# Patient Record
Sex: FEMALE | Race: WHITE | Employment: OTHER | URBAN - METROPOLITAN AREA
[De-identification: names, ages, dates, MRNs, and addresses within clinical notes are randomized per-mention and may not be internally consistent; named-entity substitution may affect disease eponyms.]

---

## 2021-04-24 ENCOUNTER — HOSPITAL ENCOUNTER (EMERGENCY)
Age: 86
Discharge: HOME | End: 2021-04-24
Attending: EMERGENCY MEDICINE
Payer: COMMERCIAL

## 2021-04-24 VITALS
HEART RATE: 71 BPM | DIASTOLIC BLOOD PRESSURE: 67 MMHG | HEIGHT: 62 IN | RESPIRATION RATE: 18 BRPM | SYSTOLIC BLOOD PRESSURE: 147 MMHG | WEIGHT: 168 LBS | BODY MASS INDEX: 30.91 KG/M2 | TEMPERATURE: 98.2 F | OXYGEN SATURATION: 95 %

## 2021-04-24 DIAGNOSIS — S81.812A SKIN TEAR OF LEFT LOWER LEG WITHOUT COMPLICATION, INITIAL ENCOUNTER: Primary | ICD-10-CM

## 2021-04-24 PROCEDURE — 99283 EMERGENCY DEPT VISIT LOW MDM: CPT

## 2021-04-24 RX ORDER — ASPIRIN 81 MG/1
TABLET ORAL
COMMUNITY

## 2021-04-24 RX ORDER — LEVOTHYROXINE SODIUM 175 UG/1
TABLET ORAL
COMMUNITY

## 2021-04-24 RX ORDER — CEPHALEXIN 500 MG/1
500 CAPSULE ORAL 2 TIMES DAILY
Qty: 14 CAPSULE | Refills: 0 | Status: SHIPPED | OUTPATIENT
Start: 2021-04-24 | End: 2021-05-01

## 2021-04-24 RX ORDER — LANOLIN ALCOHOL/MO/W.PET/CERES
CREAM (GRAM) TOPICAL
COMMUNITY

## 2021-04-24 RX ORDER — LATANOPROST 50 UG/ML
SOLUTION/ DROPS OPHTHALMIC
COMMUNITY

## 2021-04-24 RX ORDER — PYRIDOSTIGMINE BROMIDE 60 MG/1
60 TABLET ORAL 3 TIMES DAILY
COMMUNITY

## 2021-04-24 NOTE — ED PROVIDER NOTES
Emergency Department         COMPLAINT       Chief Complaint   Patient presents with    Ankle Pain     abrasion to left lateral ankle x 1 week with mild swelling and numbness      PHYSICAL EXAM      ED Triage Vitals [04/24/21 1421]   BP Temp Temp Source Pulse Resp SpO2 Height Weight   (!) 147/67 98.2 °F (36.8 °C) Oral 71 18 95 % 5' 2\" (1.575 m) 168 lb (76.2 kg)         Constitutional: Alert, oriented x3, nontoxic, answering questions appropriately, acting properly for age, in no acute distress   HEENT: Extraocular muscles intact,  Neck: Trachea midline, s  Musculoskeletal: Left lower extremity no pain at the knee. There is tenderness to lateral aspect left ankle with there is a small skin tear measuring approximate 1 cm in length. Scabbing. There is surrounding erythema measuring approximately 5 to 6 cm with some mild peripheral edema. Pedal pulses intact. Capillary refill less than 2 seconds. Neurologic: Left lower extremity motor and sensory intact. Skin: Warm and dry   Physical Exam  Musculoskeletal:        Legs:        DIAGNOSTIC RESULTS     EKG: All EKG's are interpreted by the Emergency Department Physician who either signs or Co-signs this chart in the absence of a cardiologist.    Not indicated unless otherwise documented above or in the midlevel documentation    LABS:  No results found for this visit on 04/24/21. Not indicated unless otherwise documented above or in the midlevel documentation    RADIOLOGY:   I reviewedthe radiologist interpretations:  No orders to display       Not indicated unless otherwise documented above or in the midlevel documentation    EMERGENCY DEPARTMENT COURSE:       PERTINENT ATTENDING PHYSICIAN COMMENTS:    Injury a week ago. Now red and swollen. Took family members dose of amoxicillin. No other symptoms. No drainage. Will start on Keflex. Close follow-up. Return if worse.     Faculty Attestation    I performed a history and physical examination of the patient and discussed management with the mid level provideer. I reviewed the mid level provider's note and agree with the documented findings and plan of care. Any areas of disagreement are noted on the chart. I was personally present for the key portions of any procedures. I have documented in the chart those procedures where I was not present during the key portions. I have reviewed the emergency nurses triage note. I agree with the chief complaint, past medical history, past surgical history, allergies, medications, social and family history as documented unless otherwise noted below. Documentation of the HPI, Physical Exam and Medical Decision Making performed by medical students or scribes is based on my personal performance of the HPI, PE and MDM. For Physician Assistant/ Nurse Practitioner cases/documentation I have personally evaluated this patient and have completed at least one if not all key elements of the E/M (history, physical exam, and MDM). Additional findings are as noted.        Bernard Ponce,   04/24/21 6111

## 2021-04-24 NOTE — ED PROVIDER NOTES
Temp 98.2 °F (36.8 °C) (Oral)   Resp 18   Ht 5' 2\" (1.575 m)   Wt 76.2 kg (168 lb)   SpO2 95%   BMI 30.73 kg/m²     Constitutional:  Well developed,  Well-appearing   Eyes:  Pupils equal   HENT:  Atraumatic, external ears normal, nose normal  Respiratory:  Comfortable speech and breathing  Musculoskeletal:    Full ROM of bilat UE/LE, affected area  Integument:  1.5cm skin tear superior to left malleolus, no bleeding/discharge. Area ringed with slightly with  mild edema and erythema ~ 5cm. No TTP reported. Moving ankle/foot/toes wnl. Neurologic:  Alert & oriented x 3, no focal deficits noted       DIAGNOSTIC RESULTS     EKG: All EKG's are interpreted by the Emergency Department Physician who either signs or Co-signs this chart in the absence of a cardiologist.  Not indicated    RADIOLOGY:   Reviewed the radiologist:  No orders to display     Not indicated      LABS:  Labs Reviewed - No data to display  Not indicated    EMERGENCY DEPARTMENT COURSE:     0778  Wound with some very mild erythema/edema. Providing Keflex rx. Directed to continue to keep wound clean/dry and dressed lightly as this heals. Orders Placed This Encounter   Medications    cephALEXin (KEFLEX) 500 MG capsule     Sig: Take 1 capsule by mouth 2 times daily for 7 days     Dispense:  14 capsule     Refill:  0       CONSULTS:  None      FINAL IMPRESSION      1.  Skin tear of left lower leg without complication, initial encounter          DISPOSITION/PLAN:  DISPOSITION Decision To Discharge 04/24/2021 02:41:37 PM        PATIENT REFERRED TO:  Dara Dayna  [address] [place] [address]  [phone]    Schedule an appointment as soon as possible for a visit in 3 days  for wound check      DISCHARGE MEDICATIONS:  New Prescriptions    CEPHALEXIN (KEFLEX) 500 MG CAPSULE    Take 1 capsule by mouth 2 times daily for 7 days       (Please note that portions of this note were completed with a voice recognition program. Efforts were made to edit the dictations but occasionally words are mis-transcribed.)    AIDA Spencer PA-C  04/24/21 4885

## 2022-11-05 ENCOUNTER — HOSPITAL ENCOUNTER (OUTPATIENT)
Dept: ULTRASOUND IMAGING | Age: 87
Discharge: HOME | End: 2022-11-07
Payer: COMMERCIAL

## 2022-11-05 DIAGNOSIS — N39.0 RECURRENT UTI: ICD-10-CM

## 2022-11-05 PROCEDURE — 76770 US EXAM ABDO BACK WALL COMP: CPT

## 2023-04-14 ENCOUNTER — APPOINTMENT (OUTPATIENT)
Dept: CT IMAGING | Age: 88
End: 2023-04-14
Payer: COMMERCIAL

## 2023-04-14 ENCOUNTER — HOSPITAL ENCOUNTER (EMERGENCY)
Age: 88
Discharge: HOME | End: 2023-04-14
Attending: EMERGENCY MEDICINE
Payer: COMMERCIAL

## 2023-04-14 VITALS
HEART RATE: 72 BPM | WEIGHT: 168 LBS | TEMPERATURE: 98.2 F | HEIGHT: 63 IN | RESPIRATION RATE: 16 BRPM | DIASTOLIC BLOOD PRESSURE: 73 MMHG | OXYGEN SATURATION: 94 % | SYSTOLIC BLOOD PRESSURE: 119 MMHG | BODY MASS INDEX: 29.77 KG/M2

## 2023-04-14 DIAGNOSIS — M54.32 SCIATICA OF LEFT SIDE: Primary | ICD-10-CM

## 2023-04-14 DIAGNOSIS — N83.201 CYST OF RIGHT OVARY: ICD-10-CM

## 2023-04-14 PROCEDURE — 99284 EMERGENCY DEPT VISIT MOD MDM: CPT

## 2023-04-14 PROCEDURE — 6370000000 HC RX 637 (ALT 250 FOR IP): Performed by: EMERGENCY MEDICINE

## 2023-04-14 PROCEDURE — 72131 CT LUMBAR SPINE W/O DYE: CPT

## 2023-04-14 RX ORDER — PREDNISONE 20 MG/1
40 TABLET ORAL ONCE
Status: COMPLETED | OUTPATIENT
Start: 2023-04-14 | End: 2023-04-14

## 2023-04-14 RX ORDER — OXYCODONE HYDROCHLORIDE AND ACETAMINOPHEN 5; 325 MG/1; MG/1
1-2 TABLET ORAL EVERY 6 HOURS PRN
Qty: 12 TABLET | Refills: 0 | Status: SHIPPED | OUTPATIENT
Start: 2023-04-14 | End: 2023-04-18

## 2023-04-14 RX ORDER — OXYCODONE HYDROCHLORIDE AND ACETAMINOPHEN 5; 325 MG/1; MG/1
1 TABLET ORAL ONCE
Status: COMPLETED | OUTPATIENT
Start: 2023-04-14 | End: 2023-04-14

## 2023-04-14 RX ORDER — PREDNISONE 10 MG/1
TABLET ORAL
Qty: 20 TABLET | Refills: 0 | Status: SHIPPED | OUTPATIENT
Start: 2023-04-14 | End: 2023-04-24

## 2023-04-14 RX ORDER — PREDNISONE 10 MG/1
TABLET ORAL
Qty: 20 TABLET | Refills: 0 | Status: SHIPPED | OUTPATIENT
Start: 2023-04-14 | End: 2023-04-14 | Stop reason: SDUPTHER

## 2023-04-14 RX ADMIN — PREDNISONE 40 MG: 20 TABLET ORAL at 04:44

## 2023-04-14 RX ADMIN — OXYCODONE HYDROCHLORIDE AND ACETAMINOPHEN 1 TABLET: 5; 325 TABLET ORAL at 04:44

## 2023-04-14 ASSESSMENT — PAIN SCALES - GENERAL: PAINLEVEL_OUTOF10: 9

## 2023-04-14 ASSESSMENT — PAIN - FUNCTIONAL ASSESSMENT: PAIN_FUNCTIONAL_ASSESSMENT: 0-10

## 2023-04-14 ASSESSMENT — PAIN DESCRIPTION - ORIENTATION: ORIENTATION: LEFT;RIGHT;MID

## 2023-04-14 ASSESSMENT — ENCOUNTER SYMPTOMS
ABDOMINAL PAIN: 0
COUGH: 0
SORE THROAT: 0
DIARRHEA: 0
EYE PAIN: 0
BACK PAIN: 1
RHINORRHEA: 0
NAUSEA: 0
SHORTNESS OF BREATH: 0
VOMITING: 0

## 2023-04-14 ASSESSMENT — PAIN DESCRIPTION - DESCRIPTORS: DESCRIPTORS: DISCOMFORT

## 2023-04-14 ASSESSMENT — PAIN DESCRIPTION - LOCATION: LOCATION: BACK

## 2023-04-14 ASSESSMENT — PAIN DESCRIPTION - PAIN TYPE: TYPE: ACUTE PAIN

## 2023-04-14 NOTE — DISCHARGE INSTRUCTIONS
PLEASE RETURN TO THE EMERGENCY DEPARTMENT IMMEDIATELY if your symptoms worsen in anyway or in 1-2 days if not improved for re-evaluation. You should immediately return to the ER for symptoms such as worsening pain, abdominal pain, bloody stools, numbness or weakness to the arms or legs, difficulty with urination or defecation, difficulty with ambulation, fever, coolness or color change to the extremity, chest pain, shortness of breath, a feeling that you are going to pass out, light headed, dizziness. Take your medication as indicated and prescribed. If you are given an antibiotic then, make sure you get the prescription filled and take the antibiotics until finished. Please understand that at this time there is no evidence for a more serious underlying process, but that early in the process of an illness or injury, an emergency department workup can be falsely reassuring. You should contact your family doctor within the next 48 hours for a follow up appointment    David Augustine!!!    From [facility]) and [facility] Emergency Services    On behalf of the Emergency Department staff at [facility]), I would like to thank you for giving us the opportunity to address your health care needs and concerns. We hope that during your visit, our service was delivered in a professional and caring manner. Please keep [facility]) in mind as we walk with you down the path to your own personal wellness. Please expect an automated text message or email from us so we can ask a few questions about your health and progress. Based on your answers, a clinician may call you back to offer help and instructions. Please understand that early in the process of an illness or injury, an emergency department workup can be falsely reassuring. If you notice any worsening, changing or persistent symptoms please call your family doctor or return to the ER immediately.      Tell us how we did during your visit at

## 2023-09-28 DIAGNOSIS — G70.00 MYASTHENIA GRAVIS WITHOUT (ACUTE) EXACERBATION (HCC): ICD-10-CM

## 2023-09-28 RX ORDER — PYRIDOSTIGMINE BROMIDE 60 MG/1
60 TABLET ORAL 3 TIMES DAILY
Qty: 90 TABLET | Refills: 2 | OUTPATIENT
Start: 2023-09-28

## 2023-10-12 ENCOUNTER — APPOINTMENT (OUTPATIENT)
Dept: CT IMAGING | Age: 88
End: 2023-10-12
Payer: COMMERCIAL

## 2023-10-12 ENCOUNTER — HOSPITAL ENCOUNTER (EMERGENCY)
Age: 88
Discharge: HOME | End: 2023-10-13
Attending: EMERGENCY MEDICINE
Payer: COMMERCIAL

## 2023-10-12 ENCOUNTER — APPOINTMENT (OUTPATIENT)
Dept: GENERAL RADIOLOGY | Age: 88
End: 2023-10-12
Payer: COMMERCIAL

## 2023-10-12 VITALS
TEMPERATURE: 98.8 F | RESPIRATION RATE: 14 BRPM | SYSTOLIC BLOOD PRESSURE: 103 MMHG | HEART RATE: 75 BPM | OXYGEN SATURATION: 98 % | BODY MASS INDEX: 30.12 KG/M2 | HEIGHT: 63 IN | DIASTOLIC BLOOD PRESSURE: 57 MMHG | WEIGHT: 170 LBS

## 2023-10-12 DIAGNOSIS — R41.82 ALTERED MENTAL STATUS, UNSPECIFIED ALTERED MENTAL STATUS TYPE: Primary | ICD-10-CM

## 2023-10-12 DIAGNOSIS — J18.9 PNEUMONIA OF LEFT LOWER LOBE DUE TO INFECTIOUS ORGANISM: ICD-10-CM

## 2023-10-12 DIAGNOSIS — N94.89 ADNEXAL MASS: ICD-10-CM

## 2023-10-12 DIAGNOSIS — R74.8 ELEVATED LIPASE: ICD-10-CM

## 2023-10-12 LAB
ALBUMIN SERPL-MCNC: 3.9 G/DL (ref 3.5–5.2)
ALBUMIN/GLOB SERPL: 1.6 {RATIO} (ref 1–2.5)
ALP SERPL-CCNC: 116 U/L (ref 35–104)
ALT SERPL-CCNC: 9 U/L (ref 5–33)
ANION GAP SERPL CALCULATED.3IONS-SCNC: 8 MMOL/L (ref 9–17)
AST SERPL-CCNC: 17 U/L
BACTERIA URNS QL MICRO: ABNORMAL
BASOPHILS # BLD: 0 K/UL (ref 0–0.2)
BASOPHILS NFR BLD: 1 % (ref 0–2)
BILIRUB SERPL-MCNC: 0.2 MG/DL (ref 0.3–1.2)
BILIRUB UR QL STRIP: NEGATIVE
BNP SERPL-MCNC: 823 PG/ML
BUN SERPL-MCNC: 23 MG/DL (ref 8–23)
CALCIUM SERPL-MCNC: 9.8 MG/DL (ref 8.6–10.4)
CHARACTER UR: ABNORMAL
CHLORIDE SERPL-SCNC: 105 MMOL/L (ref 98–107)
CLARITY UR: CLEAR
CO2 SERPL-SCNC: 28 MMOL/L (ref 20–31)
COLOR UR: YELLOW
CREAT SERPL-MCNC: 0.9 MG/DL (ref 0.5–0.9)
CRYSTALS URNS MICRO: ABNORMAL /HPF
EOSINOPHIL # BLD: 0.1 K/UL (ref 0–0.4)
EOSINOPHILS RELATIVE PERCENT: 3 % (ref 1–4)
EPI CELLS #/AREA URNS HPF: ABNORMAL /HPF (ref 0–5)
ERYTHROCYTE [DISTWIDTH] IN BLOOD BY AUTOMATED COUNT: 16.4 % (ref 12.5–15.4)
GFR SERPL CREATININE-BSD FRML MDRD: 59 ML/MIN/1.73M2
GLUCOSE BLD-MCNC: 132 MG/DL (ref 65–105)
GLUCOSE SERPL-MCNC: 123 MG/DL (ref 70–99)
GLUCOSE UR STRIP-MCNC: NEGATIVE MG/DL
HCT VFR BLD AUTO: 39.5 % (ref 36–46)
HGB BLD-MCNC: 13.1 G/DL (ref 12–16)
HGB UR QL STRIP.AUTO: NEGATIVE
KETONES UR STRIP-MCNC: NEGATIVE MG/DL
LEUKOCYTE ESTERASE UR QL STRIP: ABNORMAL
LIPASE SERPL-CCNC: 364 U/L (ref 13–60)
LYMPHOCYTES NFR BLD: 0.9 K/UL (ref 1–4.8)
LYMPHOCYTES RELATIVE PERCENT: 22 % (ref 24–44)
MCH RBC QN AUTO: 28.6 PG (ref 26–34)
MCHC RBC AUTO-ENTMCNC: 33.2 G/DL (ref 31–37)
MCV RBC AUTO: 86.2 FL (ref 80–100)
MONOCYTES NFR BLD: 0.5 K/UL (ref 0.1–1.2)
MONOCYTES NFR BLD: 11 % (ref 2–11)
NEUTROPHILS NFR BLD: 63 % (ref 36–66)
NEUTS SEG NFR BLD: 2.7 K/UL (ref 1.8–7.7)
NITRITE UR QL STRIP: NEGATIVE
PH UR STRIP: 6 [PH] (ref 5–8)
PLATELET # BLD AUTO: 262 K/UL (ref 140–450)
PMV BLD AUTO: 8.7 FL (ref 6–12)
POTASSIUM SERPL-SCNC: 3.8 MMOL/L (ref 3.7–5.3)
PROT SERPL-MCNC: 6.4 G/DL (ref 6.4–8.3)
PROT UR STRIP-MCNC: NEGATIVE MG/DL
RBC # BLD AUTO: 4.58 M/UL (ref 4–5.2)
RBC #/AREA URNS HPF: ABNORMAL /HPF (ref 0–2)
SODIUM SERPL-SCNC: 141 MMOL/L (ref 135–144)
SP GR UR STRIP: 1.03 (ref 1–1.03)
TROPONIN I SERPL HS-MCNC: 18 NG/L (ref 0–14)
TSH SERPL DL<=0.05 MIU/L-ACNC: 1.65 UIU/ML (ref 0.3–5)
UROBILINOGEN UR STRIP-ACNC: ABNORMAL EU/DL (ref 0–1)
WBC #/AREA URNS HPF: ABNORMAL /HPF (ref 0–5)
WBC OTHER # BLD: 4.2 K/UL (ref 3.5–11)

## 2023-10-12 PROCEDURE — 96365 THER/PROPH/DIAG IV INF INIT: CPT | Performed by: EMERGENCY MEDICINE

## 2023-10-12 PROCEDURE — 81001 URINALYSIS AUTO W/SCOPE: CPT

## 2023-10-12 PROCEDURE — 36415 COLL VENOUS BLD VENIPUNCTURE: CPT

## 2023-10-12 PROCEDURE — 83690 ASSAY OF LIPASE: CPT

## 2023-10-12 PROCEDURE — 99285 EMERGENCY DEPT VISIT HI MDM: CPT | Performed by: EMERGENCY MEDICINE

## 2023-10-12 PROCEDURE — 83880 ASSAY OF NATRIURETIC PEPTIDE: CPT

## 2023-10-12 PROCEDURE — 85025 COMPLETE CBC W/AUTO DIFF WBC: CPT

## 2023-10-12 PROCEDURE — 71045 X-RAY EXAM CHEST 1 VIEW: CPT

## 2023-10-12 PROCEDURE — 93005 ELECTROCARDIOGRAM TRACING: CPT | Performed by: EMERGENCY MEDICINE

## 2023-10-12 PROCEDURE — 82947 ASSAY GLUCOSE BLOOD QUANT: CPT

## 2023-10-12 PROCEDURE — 96367 TX/PROPH/DG ADDL SEQ IV INF: CPT | Performed by: EMERGENCY MEDICINE

## 2023-10-12 PROCEDURE — 84484 ASSAY OF TROPONIN QUANT: CPT

## 2023-10-12 PROCEDURE — 2580000003 HC RX 258: Performed by: EMERGENCY MEDICINE

## 2023-10-12 PROCEDURE — 80053 COMPREHEN METABOLIC PANEL: CPT

## 2023-10-12 PROCEDURE — 84443 ASSAY THYROID STIM HORMONE: CPT

## 2023-10-12 PROCEDURE — 74176 CT ABD & PELVIS W/O CONTRAST: CPT

## 2023-10-12 PROCEDURE — 6360000002 HC RX W HCPCS: Performed by: EMERGENCY MEDICINE

## 2023-10-12 RX ORDER — 0.9 % SODIUM CHLORIDE 0.9 %
1000 INTRAVENOUS SOLUTION INTRAVENOUS ONCE
Status: COMPLETED | OUTPATIENT
Start: 2023-10-12 | End: 2023-10-12

## 2023-10-12 RX ORDER — ACETAMINOPHEN 500 MG
1000 TABLET ORAL EVERY 8 HOURS PRN
Qty: 60 TABLET | Refills: 0 | Status: SHIPPED | OUTPATIENT
Start: 2023-10-12

## 2023-10-12 RX ORDER — AZITHROMYCIN 250 MG/1
250 TABLET, FILM COATED ORAL DAILY
Qty: 4 TABLET | Refills: 0 | Status: SHIPPED | OUTPATIENT
Start: 2023-10-13 | End: 2023-10-17

## 2023-10-12 RX ADMIN — SODIUM CHLORIDE 1000 ML: 9 INJECTION, SOLUTION INTRAVENOUS at 22:39

## 2023-10-12 RX ADMIN — CEFTRIAXONE SODIUM 1000 MG: 1 INJECTION, POWDER, FOR SOLUTION INTRAMUSCULAR; INTRAVENOUS at 22:41

## 2023-10-12 RX ADMIN — AZITHROMYCIN MONOHYDRATE 500 MG: 500 INJECTION, POWDER, LYOPHILIZED, FOR SOLUTION INTRAVENOUS at 23:22

## 2023-10-12 NOTE — ED PROVIDER NOTES
[facility] Emergency Department  [address] [address] [facility] [address]  Phone: [phone]  Fax: [phone]       Pt Name: Sly Gomez  MRN: [id number]  [address] 3/15/1929  Date of evaluation: 10/12/23  PCP:  Deniz Conroy    CHIEF COMPLAINT       Chief Complaint   Patient presents with    Abdominal Pain    urinary complaints     Pt arrives with daughter dt co possible uti and right sided abdominal pain. Altered Mental Status       HISTORY OF PRESENT ILLNESS  (Location/Symptom, Timing/Onset, Context/Setting, Quality, Duration, Modifying Factors, Severity.)    Sly Gomez is a 80 y.o. female who presents with  concerns for altered mentation. She had a fall about a week ago and had been complaining of right-sided pain ever since they did not think much of it. However the past 24 hours she has become more hostile and has developed a poor temperament which is not baseline for her. She is chronically confused due to history of dementia. They did note that she recently has had a change in her routine and did not know if this was potentially the cause. She did not hit her head when she fell. Has not had any fevers vomiting diarrhea cough congestion runny nose or headache. PAST MEDICAL / SURGICAL / SOCIAL / FAMILY HISTORY    has a past medical history of Hyperlipidemia, Macular degeneration, Thyroid disease, and Urinary incontinence. has a past surgical history that includes joint replacement; Hand surgery; and Appendectomy. Social History     Socioeconomic History    Marital status:       Spouse name: Not on file    Number of children: Not on file    Years of education: Not on file    Highest education level: Not on file   Occupational History    Not on file   Tobacco Use    Smoking status: Never    Smokeless tobacco: Never   Vaping Use    Vaping Use: Never used   Substance and Sexual Activity    Alcohol use: Not Currently    Drug use: Never    Sexual activity: Not components or calcification. It is likely ovarian in nature. Uterus and urinary bladder appear unremarkable. No evidence of pelvic lymphadenopathy or hernia. Additional smaller left adnexal cyst measures 3.4 x 3.2 cm. Peritoneum/Retroperitoneum: No evidence of retroperitoneal lymphadenopathy. No evidence of ascites or mesenteric edema. Bones/Soft Tissues: Severe multilevel degenerative disc disease. No acute abnormality. Mild T11 compression injury likely chronic. 1. Large pelvic cyst likely arising from the right adnexa measuring 13 x 11 cm in largest axial diameter. No obvious septations or solid components or calcification. See recommendations below. 2. Diverticulosis but no acute diverticulitis. Constipation and stool impaction in the rectum. 3. No evidence of gallbladder or biliary disease. No evidence of renal stones or obstructive uropathy. RECOMMENDATIONS: Pathology: Multiple ovarian cysts. Most significant: 13 cm pelvic cyst, concern for low-grade cystic neoplasm. Gynecology consult recommended and consider MR with IV contrast if clinically warranted. Reference: JACR 2020 Feb;17(2):248-254     XR CHEST PORTABLE    Result Date: 10/12/2023  EXAMINATION: ONE XRAY VIEW OF THE CHEST 10/12/2023 8:26 pm COMPARISON: None. HISTORY: ORDERING SYSTEM PROVIDED HISTORY: AMS R sided flank pain TECHNOLOGIST PROVIDED HISTORY: AMS R sided flank pain FINDINGS: The cardiac silhouette is slightly enlarged. The left hemidiaphragm is elevated. Subsegmental atelectatic changes are seen in the left lung base. No consolidations or pleural effusions. No pneumothorax. The bones are osteopenic. Surgical clips in the right upper chest wall/axilla. Elevated left hemidiaphragm with subsegmental atelectatic changes in the left lung base.       EKG    EKG Interpretation    Interpreted by emergency department physician    Rhythm: normal sinus   Rate: normal  Axis: normal  Ectopy: premature atrial

## 2023-10-13 NOTE — DISCHARGE INSTRUCTIONS
There is a large cyst arising from her pelvis on the right side. This could be causing her right-sided pain. As we discussed her lipase which is an enzyme released by the pancreas is also elevated. However I would expect more of pain in the right underneath her breastbone as well as nausea and vomiting if she was having an acute pancreatitis episode. There is no obvious evidence of gallbladder disease on the CT. There appears to be potential for early pneumonia in the left lower lobe of her lung so we are treating her as such. There is no obvious urinary tract infection from the testing done today. As we also discussed you refused a CT of her head. Therefore there is no way for me to for sure say whether or not she had a stroke, has any bleeding in her brain, or has a brain mass. If she continues to get worse, you change her mind about evaluation, or like to have her admitted please bring her back to the emergency department immediately.

## 2023-10-14 LAB
EKG ATRIAL RATE: 61 BPM
EKG P AXIS: 36 DEGREES
EKG P-R INTERVAL: 176 MS
EKG Q-T INTERVAL: 416 MS
EKG QRS DURATION: 120 MS
EKG QTC CALCULATION (BAZETT): 418 MS
EKG R AXIS: 11 DEGREES
EKG T AXIS: 144 DEGREES
EKG VENTRICULAR RATE: 61 BPM

## 2023-11-30 ENCOUNTER — TELEPHONE (OUTPATIENT)
Dept: GYNECOLOGIC ONCOLOGY | Age: 88
End: 2023-11-30

## 2023-12-07 RX ORDER — AZITHROMYCIN 250 MG/1
TABLET, FILM COATED ORAL
Qty: 4 TABLET | Refills: 0 | OUTPATIENT
Start: 2023-12-07

## 2023-12-28 ENCOUNTER — OFFICE VISIT (OUTPATIENT)
Dept: GYNECOLOGIC ONCOLOGY | Age: 88
End: 2023-12-28
Payer: COMMERCIAL

## 2023-12-28 VITALS
HEART RATE: 70 BPM | HEIGHT: 62 IN | OXYGEN SATURATION: 95 % | SYSTOLIC BLOOD PRESSURE: 98 MMHG | DIASTOLIC BLOOD PRESSURE: 60 MMHG | WEIGHT: 168 LBS | BODY MASS INDEX: 30.91 KG/M2

## 2023-12-28 DIAGNOSIS — N94.89 ADNEXAL MASS: Primary | ICD-10-CM

## 2023-12-28 PROBLEM — H35.3223: Status: ACTIVE | Noted: 2023-12-28

## 2023-12-28 PROBLEM — R26.9 GAIT DISTURBANCE: Status: ACTIVE | Noted: 2023-12-28

## 2023-12-28 PROBLEM — I51.9 HEART DISEASE: Status: ACTIVE | Noted: 2023-12-28

## 2023-12-28 PROBLEM — G70.01: Status: ACTIVE | Noted: 2023-12-28

## 2023-12-28 PROBLEM — M19.90 GENERALIZED ARTHRITIS: Status: ACTIVE | Noted: 2023-12-28

## 2023-12-28 PROBLEM — I87.2 VENOUS INSUFFICIENCY OF BOTH LOWER EXTREMITIES: Status: ACTIVE | Noted: 2023-12-28

## 2023-12-28 PROBLEM — R32 INCONTINENCE: Status: ACTIVE | Noted: 2023-12-28

## 2023-12-28 PROBLEM — E55.9 VITAMIN D DEFICIENCY: Status: ACTIVE | Noted: 2023-12-28

## 2023-12-28 PROBLEM — N39.3 SUI (STRESS URINARY INCONTINENCE, FEMALE): Status: ACTIVE | Noted: 2023-12-28

## 2023-12-28 PROBLEM — B02.9 HERPES ZOSTER WITHOUT COMPLICATION: Status: ACTIVE | Noted: 2023-12-28

## 2023-12-28 PROBLEM — H90.3 SENSORINEURAL HEARING LOSS (SNHL) OF BOTH EARS: Status: ACTIVE | Noted: 2023-12-28

## 2023-12-28 PROBLEM — F02.80 ALZHEIMER'S DISEASE, UNSPECIFIED (HCC): Status: ACTIVE | Noted: 2023-12-28

## 2023-12-28 PROBLEM — I10 ESSENTIAL (PRIMARY) HYPERTENSION: Status: ACTIVE | Noted: 2023-12-28

## 2023-12-28 PROBLEM — I51.7: Status: ACTIVE | Noted: 2023-12-28

## 2023-12-28 PROBLEM — G30.9 ALZHEIMER'S DISEASE, UNSPECIFIED (HCC): Status: ACTIVE | Noted: 2023-12-28

## 2023-12-28 PROBLEM — M54.12 CERVICAL RADICULOPATHY: Status: ACTIVE | Noted: 2023-12-28

## 2023-12-28 PROBLEM — D50.9 IRON DEFICIENCY ANEMIA: Status: ACTIVE | Noted: 2023-12-28

## 2023-12-28 PROBLEM — N32.81 OVERACTIVE BLADDER: Status: ACTIVE | Noted: 2023-12-28

## 2023-12-28 PROCEDURE — 99205 OFFICE O/P NEW HI 60 MIN: CPT | Performed by: OBSTETRICS & GYNECOLOGY

## 2023-12-28 PROCEDURE — 1123F ACP DISCUSS/DSCN MKR DOCD: CPT | Performed by: OBSTETRICS & GYNECOLOGY

## 2023-12-28 NOTE — PROGRESS NOTES
Gyn Oncology Attending Note    Elderly demented woman with large asymptomatic likely benign pelvic/ovarian cyst  No pain, no bleeding  No GI or  troubles  Daughter is her medical decision maker  I revd the notes records labs imaging and history in detail today  The patient's daughter tells me today that she is not interested in any intervention or close surveillance  Physical exam declined today  She appears to be in no distress today  I counseled patient and daughter today  I answered all questions to her/their satisfaction    Plan:    No surveillance. No follow up requested. I gave the patient's daughter my office and cell number today if she needs to call me in future regarding this likely benign mass and any mgmt options if the clinical scenario or opinions change.       Kate Ruiz MD
Review of Systems   Constitutional: Negative. HENT:   Positive for lump/mass (bump from falling in August). Eyes: Negative. Respiratory: Negative. Cardiovascular: Negative. Gastrointestinal: Negative. Endocrine: Negative. Genitourinary:  Positive for bladder incontinence. Musculoskeletal: Negative. Skin: Negative. Neurological: Negative. Hematological:  Bruises/bleeds easily.
distress. Limited mentation and minimally interactive    HEENT:  EOM intact, DA, no cervical or supraclavicular lymphadenopathy. No Thyromegaly. Heart: Auscultation of heart revels a regular rate without murmur, gallops, or rubs. Lungs:  Clear bilaterally to auscultation to the bases. CVA: No tenderness. Abdomen: Soft, nontender, no significant distension, no rebound, guarding, or rigidity     Lower Extremities:  No lymphedema, no calf tenderness, no musculoskeletal deformities. Pelvic Exam:  deferred        Assessment/Plan:  1. Adnexal mass        Inicidentally found right adnexal mass with interval increase in size since initially seen in 2015. Discussed management options including surveillance with conservative care, possible IR drainage for symptomatic management, or more aggressive surgical approach. Given patient's age and significant mental limitations secondary to dementia, the patient's daughter is in agreement with conservative surveillance. Patient given office contact information. Will follow up with patient's daughter in 4-6 months to assess patient's clinical status.       Electronically signed by Anita Barboza MD on 12/28/23 at 9:34 AM EST

## 2024-09-27 LAB — URINE CULTURE, ROUTINE: NORMAL STATUS

## 2025-04-12 ENCOUNTER — HOSPITAL ENCOUNTER (EMERGENCY)
Age: 89
Discharge: HOME | End: 2025-04-12
Attending: STUDENT IN AN ORGANIZED HEALTH CARE EDUCATION/TRAINING PROGRAM
Payer: COMMERCIAL

## 2025-04-12 ENCOUNTER — APPOINTMENT (OUTPATIENT)
Dept: GENERAL RADIOLOGY | Age: 89
End: 2025-04-12
Payer: COMMERCIAL

## 2025-04-12 ENCOUNTER — APPOINTMENT (OUTPATIENT)
Dept: CT IMAGING | Age: 89
End: 2025-04-12
Payer: COMMERCIAL

## 2025-04-12 VITALS
SYSTOLIC BLOOD PRESSURE: 130 MMHG | RESPIRATION RATE: 16 BRPM | TEMPERATURE: 98.1 F | BODY MASS INDEX: 25.87 KG/M2 | HEIGHT: 63 IN | OXYGEN SATURATION: 91 % | DIASTOLIC BLOOD PRESSURE: 64 MMHG | HEART RATE: 71 BPM | WEIGHT: 146 LBS

## 2025-04-12 DIAGNOSIS — S32.039A CLOSED FRACTURE OF THIRD LUMBAR VERTEBRA, UNSPECIFIED FRACTURE MORPHOLOGY, INITIAL ENCOUNTER (HCC): ICD-10-CM

## 2025-04-12 DIAGNOSIS — S92.352A DISPLACED FRACTURE OF FIFTH METATARSAL BONE, LEFT FOOT, INITIAL ENCOUNTER FOR CLOSED FRACTURE: Primary | ICD-10-CM

## 2025-04-12 LAB
ANION GAP SERPL CALCULATED.3IONS-SCNC: 8 MMOL/L (ref 9–17)
BACTERIA URNS QL MICRO: ABNORMAL
BASOPHILS # BLD: 0 K/UL (ref 0–0.2)
BASOPHILS NFR BLD: 1 % (ref 0–2)
BILIRUB UR QL STRIP: NEGATIVE
BUN SERPL-MCNC: 17 MG/DL (ref 8–23)
CALCIUM SERPL-MCNC: 8.8 MG/DL (ref 8.6–10.4)
CHARACTER UR: ABNORMAL
CHLORIDE SERPL-SCNC: 106 MMOL/L (ref 98–107)
CLARITY UR: CLEAR
CO2 SERPL-SCNC: 24 MMOL/L (ref 20–31)
COLOR UR: YELLOW
CREAT SERPL-MCNC: 0.9 MG/DL (ref 0.5–0.9)
EOSINOPHIL # BLD: 0 K/UL (ref 0–0.4)
EOSINOPHILS RELATIVE PERCENT: 1 % (ref 1–4)
EPI CELLS #/AREA URNS HPF: ABNORMAL /HPF (ref 0–5)
ERYTHROCYTE [DISTWIDTH] IN BLOOD BY AUTOMATED COUNT: 14.8 % (ref 12.5–15.4)
GFR, ESTIMATED: 59 ML/MIN/1.73M2
GLUCOSE SERPL-MCNC: 108 MG/DL (ref 70–99)
GLUCOSE UR STRIP-MCNC: NEGATIVE MG/DL
HCT VFR BLD AUTO: 35.4 % (ref 36–46)
HGB BLD-MCNC: 11.9 G/DL (ref 12–16)
HGB UR QL STRIP.AUTO: ABNORMAL
KETONES UR STRIP-MCNC: ABNORMAL MG/DL
LEUKOCYTE ESTERASE UR QL STRIP: NEGATIVE
LYMPHOCYTES NFR BLD: 0.7 K/UL (ref 1–4.8)
LYMPHOCYTES RELATIVE PERCENT: 11 % (ref 24–44)
MCH RBC QN AUTO: 31.1 PG (ref 26–34)
MCHC RBC AUTO-ENTMCNC: 33.6 G/DL (ref 31–37)
MCV RBC AUTO: 92.8 FL (ref 80–100)
MONOCYTES NFR BLD: 0.5 K/UL (ref 0.1–1.2)
MONOCYTES NFR BLD: 8 % (ref 2–11)
NEUTROPHILS NFR BLD: 79 % (ref 36–66)
NEUTS SEG NFR BLD: 4.9 K/UL (ref 1.8–7.7)
NITRITE UR QL STRIP: NEGATIVE
PH UR STRIP: 6 [PH] (ref 5–8)
PLATELET # BLD AUTO: 285 K/UL (ref 140–450)
PMV BLD AUTO: 7.8 FL (ref 6–12)
POTASSIUM SERPL-SCNC: 4.6 MMOL/L (ref 3.7–5.3)
PROT UR STRIP-MCNC: NEGATIVE MG/DL
RBC # BLD AUTO: 3.81 M/UL (ref 4–5.2)
RBC #/AREA URNS HPF: ABNORMAL /HPF (ref 0–2)
SODIUM SERPL-SCNC: 138 MMOL/L (ref 135–144)
SP GR UR STRIP: 1.01 (ref 1–1.03)
UROBILINOGEN UR STRIP-ACNC: ABNORMAL EU/DL (ref 0–1)
WBC #/AREA URNS HPF: ABNORMAL /HPF (ref 0–5)
WBC OTHER # BLD: 6.2 K/UL (ref 3.5–11)

## 2025-04-12 PROCEDURE — 80048 BASIC METABOLIC PNL TOTAL CA: CPT

## 2025-04-12 PROCEDURE — 74176 CT ABD & PELVIS W/O CONTRAST: CPT

## 2025-04-12 PROCEDURE — 85025 COMPLETE CBC W/AUTO DIFF WBC: CPT

## 2025-04-12 PROCEDURE — 81001 URINALYSIS AUTO W/SCOPE: CPT

## 2025-04-12 PROCEDURE — 99284 EMERGENCY DEPT VISIT MOD MDM: CPT

## 2025-04-12 PROCEDURE — 73630 X-RAY EXAM OF FOOT: CPT

## 2025-04-12 ASSESSMENT — PAIN - FUNCTIONAL ASSESSMENT
PAIN_FUNCTIONAL_ASSESSMENT: 0-10
PAIN_FUNCTIONAL_ASSESSMENT: PREVENTS OR INTERFERES SOME ACTIVE ACTIVITIES AND ADLS

## 2025-04-12 ASSESSMENT — LIFESTYLE VARIABLES
HOW OFTEN DO YOU HAVE A DRINK CONTAINING ALCOHOL: NEVER
HOW MANY STANDARD DRINKS CONTAINING ALCOHOL DO YOU HAVE ON A TYPICAL DAY: PATIENT DOES NOT DRINK

## 2025-04-12 ASSESSMENT — PAIN SCALES - GENERAL: PAINLEVEL_OUTOF10: 3

## 2025-04-12 ASSESSMENT — PAIN DESCRIPTION - DESCRIPTORS: DESCRIPTORS: SORE

## 2025-04-12 ASSESSMENT — PAIN DESCRIPTION - LOCATION: LOCATION: LEG;BACK

## 2025-04-12 NOTE — PROGRESS NOTES
Spiritual Health History and Assessment/Progress Note  [facility]    (P) Spiritual/Emotional Needs, Initial Encounter, (P) Emotional distress, (P) Life Adjustments, Adjustment to illness, Anticipatory Grief,      Name: Lynnette Rosado MRN: [id number]    Age: 89 y.o.     Sex: female   Language: English   Worship: Hindu   <principal problem not specified>     Date: 4/12/2025            Total Time Calculated: (P) 20 min              Spiritual Assessment began in [facility] EMERGENCY DEPARTMENT        Referral/Consult From: (P) Rounding   Encounter Overview/Reason: (P) Spiritual/Emotional Needs, Initial Encounter  Service Provided For: (P) Patient, Family       provided support and pastoral care. Pt. Was open to conversation and family was present during visit.  provided empathic listening and support. Provided calmness and advocated for support help. Prayer was offered for Pt. And family. For comfort, strength and peace of mind.    Essence, Belief, Meaning:   Patient has beliefs or practices that help with coping during difficult times  Family/Friends have beliefs or practices that help with coping during difficult times      Importance and Influence:  Patient has spiritual/personal beliefs that influence decisions regarding their health  Family/Friends have spiritual/personal beliefs that influence decisions regarding the patient's health    Community:  Patient feels well-supported. Support system includes: Children  Family/Friends feel well-supported. Support system includes: Parent/s    Assessment and Plan of Care:     Patient Interventions include: Facilitated expression of thoughts and feelings and Explored spiritual coping/struggle/distress  Family/Friends Interventions include: Facilitated expression of thoughts and feelings and Explored spiritual coping/struggle/distress    Patient Plan of Care: Spiritual Care available upon further referral  Family/Friends Plan of Care:

## 2025-04-12 NOTE — DISCHARGE INSTRUCTIONS
Home.  Physical therapy to follow, eval and treat.    Wear the fracture shoe to help with ambulation    Rest as often as you can.  Tylenol and Motrin as needed for pain    Follow-up with orthopedics to discuss routine healing    Return to the emergency department for any worsening symptoms, inability to ambulate, or any other concerns

## 2025-04-12 NOTE — ED PROVIDER NOTES
[facility] EMERGENCY DEPARTMENT  EMERGENCY DEPARTMENT ENCOUNTER      Pt Name: Lynnette Rosado  MRN: [id number]  Birthdate 3/15/1929  Date of evaluation: 4/12/2025  Provider: TEETEE Ortega CNP  9:10 AM    CHIEF COMPLAINT       Chief Complaint   Patient presents with    Extremity Weakness     Fell 2 weeks ago x 3, seen by visiting physician, had xrays down last pm.  Patient having leg pain, weakness, and swelling, also having back pain.         HISTORY OF PRESENT ILLNESS    Lynnette Rosado is a 89 y.o. female who presents to the emergency department for evaluation of multiple falls and low back pain.  Daughter states that 2 weeks ago, she fell 3 times, all of them being minor falls addressed sliding and hitting the floor landing on her bottom.  She did not hit her head.  Visiting physician came out, x-rays were done at the bedside they do not have the results.    Increased weakness with inability to ambulate and bear weight, needing another fall today, EMS was called out for lift assist and daughter brought her in for evaluation.  History of falls, has spent some time in rehab but currently resides at home with daughter and caregivers    HPI    Nursing Notes were reviewed.    REVIEW OF SYSTEMS       Review of Systems   Unable to perform ROS: Dementia       Except as noted above the remainder of the review of systems was reviewed and negative.       PAST MEDICAL HISTORY     Past Medical History:   Diagnosis Date    Hyperlipidemia     Macular degeneration     Thyroid disease     Urinary incontinence          SURGICAL HISTORY       Past Surgical History:   Procedure Laterality Date    APPENDECTOMY      HAND SURGERY      JOINT REPLACEMENT      knee         CURRENT MEDICATIONS       Discharge Medication List as of 4/12/2025  5:27 PM        CONTINUE these medications which have NOT CHANGED    Details   acetaminophen (TYLENOL) 500 MG tablet Take 2 tablets by mouth every 8 hours as needed for Pain, Disp-60 tablet,        CONSULTS:  None    PROCEDURES:  Unless otherwise noted below, none     Procedures        FINAL IMPRESSION      1. Displaced fracture of fifth metatarsal bone, left foot, initial encounter for closed fracture    2. Closed fracture of third lumbar vertebra, unspecified fracture morphology, initial encounter ([facility])          DISPOSITION/PLAN   DISPOSITION Decision To Discharge 04/12/2025 05:08:36 PM   DISPOSITION CONDITION Stable           PATIENT REFERRED TO:  Ramiro Magdaleno DO  [address] [facility] [address]  [place] [facility] [address]  [phone]    In 2 days  to recheck fracture    [facility] Emergency Department  [address]  [facility] [id number] [phone]    If symptoms worsen    Thania Brooks APRN - NP  [address] [facility]  [address]  [place] [facility] [id number] [phone]      As needed      DISCHARGE MEDICATIONS:  Discharge Medication List as of 4/12/2025  5:27 PM        Controlled Substances Monitoring:          No data to display                (Please note that portions of this note were completed with a voice recognition program.  Efforts were made to edit the dictations but occasionally words are mis-transcribed.)    TEETEE Ortega CNP (electronically signed)             Liliya Champion APRN - CNP  04/13/25 0912

## 2025-04-12 NOTE — ED PROVIDER NOTES
[facility] Emergency Department  [address]  [facility] [address]  Phone: [phone]  Fax: [phone]      Attending Physician Attestation    I personally made/approves the management plan for this patient's and take responsibility for the patient management.    89-year-old female, known history of dementia.  Has had multiple falls, has great home care and daughter at bedside.  Concern for back pain, leg pain and difficulty walking.  Patient at baseline per family.  Found to have fracture to the fifth met, nondisplaced.  Also has a nondisplaced fracture of the anterior inferior endplate of L3.  Discussed with orthopedics who recommends fracture shoe, follow-up outpatient.  Family does have physical therapy set up for home.    ED Course as of 04/12/25 1750   Sat Apr 12, 2025 1708 Discussed the case with orthopedics, Dr. Ramiro Magdaleno on-call who is okay with patient going home in a fracture shoe.  We discussed her dementia and inability to perform nonweightbearing and he agrees that home is okay. [MR]      ED Course User Index  [MR] Liliya Champion, APRN - CNP       (Please note that portions of this note were completed with a voice recognition program.  Efforts were made to edit the dictations but occasionally words are mis-transcribed.)    Sandhya Martino MD  Attending Emergency Medicine Physician        Sandhya Martino MD  04/12/25 3582

## 2025-04-30 ENCOUNTER — HOSPITAL ENCOUNTER (OUTPATIENT)
Age: 89
Setting detail: OBSERVATION
Discharge: NURSING, REHAB OR LONG-TERM CARE | End: 2025-05-05
Attending: EMERGENCY MEDICINE | Admitting: STUDENT IN AN ORGANIZED HEALTH CARE EDUCATION/TRAINING PROGRAM
Payer: COMMERCIAL

## 2025-04-30 ENCOUNTER — APPOINTMENT (OUTPATIENT)
Dept: CT IMAGING | Age: 89
End: 2025-04-30
Payer: COMMERCIAL

## 2025-04-30 DIAGNOSIS — S32.040A COMPRESSION FRACTURE OF L4 VERTEBRA, INITIAL ENCOUNTER (HCC): ICD-10-CM

## 2025-04-30 DIAGNOSIS — R53.1 GENERALIZED WEAKNESS: Primary | ICD-10-CM

## 2025-04-30 DIAGNOSIS — J90 PLEURAL EFFUSION: ICD-10-CM

## 2025-04-30 DIAGNOSIS — S32.040A COMPRESSION FRACTURE OF L4 LUMBAR VERTEBRA, CLOSED, INITIAL ENCOUNTER (HCC): ICD-10-CM

## 2025-04-30 DIAGNOSIS — N30.00 ACUTE CYSTITIS WITHOUT HEMATURIA: ICD-10-CM

## 2025-04-30 DIAGNOSIS — S32.030D COMPRESSION FRACTURE OF L3 VERTEBRA WITH ROUTINE HEALING, SUBSEQUENT ENCOUNTER: ICD-10-CM

## 2025-04-30 DIAGNOSIS — R29.6 RECURRENT FALLS: ICD-10-CM

## 2025-04-30 LAB
ANION GAP SERPL CALCULATED.3IONS-SCNC: 8 MMOL/L (ref 9–17)
BACTERIA URNS QL MICRO: ABNORMAL
BASOPHILS # BLD: 0 K/UL (ref 0–0.2)
BASOPHILS NFR BLD: 1 % (ref 0–2)
BILIRUB UR QL STRIP: NEGATIVE
BUN SERPL-MCNC: 21 MG/DL (ref 8–23)
CALCIUM SERPL-MCNC: 9.3 MG/DL (ref 8.6–10.4)
CHARACTER UR: ABNORMAL
CHLORIDE SERPL-SCNC: 106 MMOL/L (ref 98–107)
CLARITY UR: ABNORMAL
CO2 SERPL-SCNC: 25 MMOL/L (ref 20–31)
COLOR UR: YELLOW
CREAT SERPL-MCNC: 0.9 MG/DL (ref 0.5–0.9)
EOSINOPHIL # BLD: 0.1 K/UL (ref 0–0.4)
EOSINOPHILS RELATIVE PERCENT: 2 % (ref 1–4)
EPI CELLS #/AREA URNS HPF: ABNORMAL /HPF (ref 0–5)
ERYTHROCYTE [DISTWIDTH] IN BLOOD BY AUTOMATED COUNT: 15.6 % (ref 12.5–15.4)
GFR, ESTIMATED: 59 ML/MIN/1.73M2
GLUCOSE SERPL-MCNC: 124 MG/DL (ref 70–99)
GLUCOSE UR STRIP-MCNC: ABNORMAL MG/DL
HCT VFR BLD AUTO: 38.8 % (ref 36–46)
HGB BLD-MCNC: 12.5 G/DL (ref 12–16)
HGB UR QL STRIP.AUTO: NEGATIVE
KETONES UR STRIP-MCNC: ABNORMAL MG/DL
LEUKOCYTE ESTERASE UR QL STRIP: NEGATIVE
LYMPHOCYTES NFR BLD: 0.8 K/UL (ref 1–4.8)
LYMPHOCYTES RELATIVE PERCENT: 13 % (ref 24–44)
MCH RBC QN AUTO: 30 PG (ref 26–34)
MCHC RBC AUTO-ENTMCNC: 32.3 G/DL (ref 31–37)
MCV RBC AUTO: 92.9 FL (ref 80–100)
MONOCYTES NFR BLD: 0.6 K/UL (ref 0.1–1.2)
MONOCYTES NFR BLD: 10 % (ref 2–11)
NEUTROPHILS NFR BLD: 74 % (ref 36–66)
NEUTS SEG NFR BLD: 4.6 K/UL (ref 1.8–7.7)
NITRITE UR QL STRIP: NEGATIVE
PH UR STRIP: 6 [PH] (ref 5–8)
PLATELET # BLD AUTO: 268 K/UL (ref 140–450)
PMV BLD AUTO: 8.1 FL (ref 6–12)
POTASSIUM SERPL-SCNC: 3.9 MMOL/L (ref 3.7–5.3)
PROT UR STRIP-MCNC: NEGATIVE MG/DL
RBC # BLD AUTO: 4.18 M/UL (ref 4–5.2)
RBC #/AREA URNS HPF: ABNORMAL /HPF (ref 0–2)
SODIUM SERPL-SCNC: 139 MMOL/L (ref 135–144)
SP GR UR STRIP: 1.02 (ref 1–1.03)
TROPONIN I SERPL HS-MCNC: 20 NG/L (ref 0–14)
UROBILINOGEN UR STRIP-ACNC: ABNORMAL EU/DL (ref 0–1)
WBC #/AREA URNS HPF: ABNORMAL /HPF (ref 0–5)
WBC OTHER # BLD: 6.1 K/UL (ref 3.5–11)

## 2025-04-30 PROCEDURE — 80048 BASIC METABOLIC PNL TOTAL CA: CPT

## 2025-04-30 PROCEDURE — 99285 EMERGENCY DEPT VISIT HI MDM: CPT

## 2025-04-30 PROCEDURE — 93005 ELECTROCARDIOGRAM TRACING: CPT | Performed by: NURSE PRACTITIONER

## 2025-04-30 PROCEDURE — 36415 COLL VENOUS BLD VENIPUNCTURE: CPT

## 2025-04-30 PROCEDURE — 84484 ASSAY OF TROPONIN QUANT: CPT

## 2025-04-30 PROCEDURE — 87077 CULTURE AEROBIC IDENTIFY: CPT

## 2025-04-30 PROCEDURE — 85025 COMPLETE CBC W/AUTO DIFF WBC: CPT

## 2025-04-30 PROCEDURE — 87086 URINE CULTURE/COLONY COUNT: CPT

## 2025-04-30 PROCEDURE — 81001 URINALYSIS AUTO W/SCOPE: CPT

## 2025-04-30 PROCEDURE — 74176 CT ABD & PELVIS W/O CONTRAST: CPT

## 2025-04-30 ASSESSMENT — LIFESTYLE VARIABLES
HOW MANY STANDARD DRINKS CONTAINING ALCOHOL DO YOU HAVE ON A TYPICAL DAY: PATIENT DOES NOT DRINK
HOW OFTEN DO YOU HAVE A DRINK CONTAINING ALCOHOL: NEVER

## 2025-04-30 ASSESSMENT — PAIN - FUNCTIONAL ASSESSMENT: PAIN_FUNCTIONAL_ASSESSMENT: NONE - DENIES PAIN

## 2025-04-30 NOTE — ED PROVIDER NOTES
[address]  EMERGENCY DEPARTMENT ENCOUNTER      Pt Name: Lynnette Rosado  MRN: [id number]  Birthdate 3/15/1929  Date of evaluation: 4/30/2025  Provider: TEETEE Ortega CNP  12:06 PM    CHIEF COMPLAINT       Chief Complaint   Patient presents with    Back Pain     History if L3 compression fracture, fall x3 weeks ago, at home PT and Home doctor, daughter states she doesn't feel patient is getting better wants her evaluated         HISTORY OF PRESENT ILLNESS    Lynnette Rosado is a 89 y.o. female who presents to the emergency department for evaluation of weakness.  Patient was seen and evaluated about 3 weeks ago for a fall, she had a foot fracture and L3 compression fracture.  Ultimately she was sent home due to the fact that she had physical therapy in the home with follow-up outpatient with orthopedics.  Per the daughter, they do not feel that she is improving.  Patient does have a history of Alzheimer's and is noncontributory to her history.  She denies any pain, ROS not reliable.  Most history obtained from daughter    HPI    Nursing Notes were reviewed.    REVIEW OF SYSTEMS       Review of Systems   All other systems reviewed and are negative.      Except as noted above the remainder of the review of systems was reviewed and negative.       PAST MEDICAL HISTORY     Past Medical History:   Diagnosis Date    Hyperlipidemia     Macular degeneration     Thyroid disease     Urinary incontinence          SURGICAL HISTORY       Past Surgical History:   Procedure Laterality Date    APPENDECTOMY      HAND SURGERY      JOINT REPLACEMENT      knee         CURRENT MEDICATIONS       Current Discharge Medication List        CONTINUE these medications which have NOT CHANGED    Details   acetaminophen (TYLENOL) 500 MG tablet Take 2 tablets by mouth every 8 hours as needed for Pain  Qty: 60 tablet, Refills: 0      levothyroxine (SYNTHROID) 175 MCG tablet Levothyroxine Sodium TABS  Refills: 0  Active      pyridostigmine

## 2025-05-01 ENCOUNTER — APPOINTMENT (OUTPATIENT)
Dept: MRI IMAGING | Age: 89
End: 2025-05-01
Payer: COMMERCIAL

## 2025-05-01 ENCOUNTER — ANESTHESIA EVENT (OUTPATIENT)
Dept: OPERATING ROOM | Age: 89
End: 2025-05-01
Payer: COMMERCIAL

## 2025-05-01 PROBLEM — R53.1 GENERALIZED WEAKNESS: Status: ACTIVE | Noted: 2025-05-01

## 2025-05-01 PROBLEM — S32.040A COMPRESSION FRACTURE OF L4 LUMBAR VERTEBRA, CLOSED, INITIAL ENCOUNTER (HCC): Status: ACTIVE | Noted: 2025-05-01

## 2025-05-01 PROBLEM — S32.040A CLOSED COMPRESSION FRACTURE OF L4 LUMBAR VERTEBRA, INITIAL ENCOUNTER (HCC): Status: ACTIVE | Noted: 2025-05-01

## 2025-05-01 LAB
EKG ATRIAL RATE: 62 BPM
EKG P AXIS: 77 DEGREES
EKG P-R INTERVAL: 146 MS
EKG Q-T INTERVAL: 408 MS
EKG QRS DURATION: 122 MS
EKG QTC CALCULATION (BAZETT): 414 MS
EKG R AXIS: 1 DEGREES
EKG T AXIS: 143 DEGREES
EKG VENTRICULAR RATE: 62 BPM

## 2025-05-01 PROCEDURE — 96374 THER/PROPH/DIAG INJ IV PUSH: CPT

## 2025-05-01 PROCEDURE — 2500000003 HC RX 250 WO HCPCS: Performed by: STUDENT IN AN ORGANIZED HEALTH CARE EDUCATION/TRAINING PROGRAM

## 2025-05-01 PROCEDURE — 2500000003 HC RX 250 WO HCPCS: Performed by: EMERGENCY MEDICINE

## 2025-05-01 PROCEDURE — G0378 HOSPITAL OBSERVATION PER HR: HCPCS

## 2025-05-01 PROCEDURE — 99223 1ST HOSP IP/OBS HIGH 75: CPT | Performed by: STUDENT IN AN ORGANIZED HEALTH CARE EDUCATION/TRAINING PROGRAM

## 2025-05-01 PROCEDURE — 6370000000 HC RX 637 (ALT 250 FOR IP): Performed by: STUDENT IN AN ORGANIZED HEALTH CARE EDUCATION/TRAINING PROGRAM

## 2025-05-01 PROCEDURE — 93010 ELECTROCARDIOGRAM REPORT: CPT | Performed by: INTERNAL MEDICINE

## 2025-05-01 PROCEDURE — 6360000002 HC RX W HCPCS: Performed by: EMERGENCY MEDICINE

## 2025-05-01 PROCEDURE — 72148 MRI LUMBAR SPINE W/O DYE: CPT

## 2025-05-01 RX ORDER — POLYETHYLENE GLYCOL 3350 17 G/17G
17 POWDER, FOR SOLUTION ORAL DAILY PRN
Status: DISCONTINUED | OUTPATIENT
Start: 2025-05-01 | End: 2025-05-05 | Stop reason: HOSPADM

## 2025-05-01 RX ORDER — SODIUM CHLORIDE 0.9 % (FLUSH) 0.9 %
5-40 SYRINGE (ML) INJECTION EVERY 12 HOURS SCHEDULED
Status: DISCONTINUED | OUTPATIENT
Start: 2025-05-01 | End: 2025-05-05 | Stop reason: HOSPADM

## 2025-05-01 RX ORDER — SODIUM CHLORIDE 9 MG/ML
INJECTION, SOLUTION INTRAVENOUS PRN
Status: DISCONTINUED | OUTPATIENT
Start: 2025-05-01 | End: 2025-05-05 | Stop reason: HOSPADM

## 2025-05-01 RX ORDER — ACETAMINOPHEN 325 MG/1
650 TABLET ORAL EVERY 6 HOURS PRN
Status: DISCONTINUED | OUTPATIENT
Start: 2025-05-01 | End: 2025-05-01 | Stop reason: SDUPTHER

## 2025-05-01 RX ORDER — NITROFURANTOIN 25; 75 MG/1; MG/1
100 CAPSULE ORAL DAILY
Status: DISCONTINUED | OUTPATIENT
Start: 2025-05-01 | End: 2025-05-03

## 2025-05-01 RX ORDER — POTASSIUM CHLORIDE 1500 MG/1
40 TABLET, EXTENDED RELEASE ORAL PRN
Status: DISCONTINUED | OUTPATIENT
Start: 2025-05-01 | End: 2025-05-05 | Stop reason: HOSPADM

## 2025-05-01 RX ORDER — ENOXAPARIN SODIUM 100 MG/ML
40 INJECTION SUBCUTANEOUS DAILY
Status: DISCONTINUED | OUTPATIENT
Start: 2025-05-01 | End: 2025-05-05 | Stop reason: HOSPADM

## 2025-05-01 RX ORDER — ONDANSETRON 4 MG/1
4 TABLET, ORALLY DISINTEGRATING ORAL EVERY 8 HOURS PRN
Status: DISCONTINUED | OUTPATIENT
Start: 2025-05-01 | End: 2025-05-05 | Stop reason: HOSPADM

## 2025-05-01 RX ORDER — ACETAMINOPHEN 500 MG
1000 TABLET ORAL EVERY 8 HOURS PRN
Status: DISCONTINUED | OUTPATIENT
Start: 2025-05-01 | End: 2025-05-05 | Stop reason: HOSPADM

## 2025-05-01 RX ORDER — TROSPIUM CHLORIDE 20 MG/1
20 TABLET, FILM COATED ORAL
Status: DISCONTINUED | OUTPATIENT
Start: 2025-05-01 | End: 2025-05-03

## 2025-05-01 RX ORDER — ONDANSETRON 2 MG/ML
4 INJECTION INTRAMUSCULAR; INTRAVENOUS EVERY 6 HOURS PRN
Status: DISCONTINUED | OUTPATIENT
Start: 2025-05-01 | End: 2025-05-05 | Stop reason: HOSPADM

## 2025-05-01 RX ORDER — ACETAMINOPHEN 650 MG/1
650 SUPPOSITORY RECTAL EVERY 6 HOURS PRN
Status: DISCONTINUED | OUTPATIENT
Start: 2025-05-01 | End: 2025-05-05 | Stop reason: HOSPADM

## 2025-05-01 RX ORDER — MAGNESIUM SULFATE IN WATER 40 MG/ML
2000 INJECTION, SOLUTION INTRAVENOUS PRN
Status: DISCONTINUED | OUTPATIENT
Start: 2025-05-01 | End: 2025-05-05 | Stop reason: HOSPADM

## 2025-05-01 RX ORDER — PYRIDOSTIGMINE BROMIDE 60 MG/1
60 TABLET ORAL 3 TIMES DAILY
Status: DISCONTINUED | OUTPATIENT
Start: 2025-05-01 | End: 2025-05-05 | Stop reason: HOSPADM

## 2025-05-01 RX ORDER — POTASSIUM CHLORIDE 7.45 MG/ML
10 INJECTION INTRAVENOUS PRN
Status: DISCONTINUED | OUTPATIENT
Start: 2025-05-01 | End: 2025-05-05 | Stop reason: HOSPADM

## 2025-05-01 RX ORDER — SODIUM CHLORIDE 0.9 % (FLUSH) 0.9 %
5-40 SYRINGE (ML) INJECTION PRN
Status: DISCONTINUED | OUTPATIENT
Start: 2025-05-01 | End: 2025-05-05 | Stop reason: HOSPADM

## 2025-05-01 RX ADMIN — SODIUM CHLORIDE, PRESERVATIVE FREE 10 ML: 5 INJECTION INTRAVENOUS at 01:26

## 2025-05-01 RX ADMIN — WATER 1000 MG: 1 INJECTION INTRAMUSCULAR; INTRAVENOUS; SUBCUTANEOUS at 00:32

## 2025-05-01 RX ADMIN — TROSPIUM CHLORIDE 20 MG: 20 TABLET, FILM COATED ORAL at 17:14

## 2025-05-01 RX ADMIN — PYRIDOSTIGMINE BROMIDE 60 MG: 60 TABLET ORAL at 10:48

## 2025-05-01 RX ADMIN — LEVOTHYROXINE SODIUM 175 MCG: 0.05 TABLET ORAL at 06:51

## 2025-05-01 RX ADMIN — TROSPIUM CHLORIDE 20 MG: 20 TABLET, FILM COATED ORAL at 06:51

## 2025-05-01 RX ADMIN — SODIUM CHLORIDE, PRESERVATIVE FREE 10 ML: 5 INJECTION INTRAVENOUS at 21:00

## 2025-05-01 RX ADMIN — ACETAMINOPHEN 1000 MG: 500 TABLET ORAL at 04:41

## 2025-05-01 RX ADMIN — SODIUM CHLORIDE, PRESERVATIVE FREE 10 ML: 5 INJECTION INTRAVENOUS at 10:50

## 2025-05-01 RX ADMIN — NITROFURANTOIN MONOHYDRATE/MACROCRYSTALS 100 MG: 25; 75 CAPSULE ORAL at 10:48

## 2025-05-01 RX ADMIN — PYRIDOSTIGMINE BROMIDE 60 MG: 60 TABLET ORAL at 21:00

## 2025-05-01 RX ADMIN — ACETAMINOPHEN 1000 MG: 500 TABLET ORAL at 17:14

## 2025-05-01 ASSESSMENT — PAIN SCALES - GENERAL
PAINLEVEL_OUTOF10: 4
PAINLEVEL_OUTOF10: 3
PAINLEVEL_OUTOF10: 5

## 2025-05-01 ASSESSMENT — PAIN DESCRIPTION - DESCRIPTORS
DESCRIPTORS: ACHING;DISCOMFORT
DESCRIPTORS: ACHING

## 2025-05-01 ASSESSMENT — PAIN DESCRIPTION - ORIENTATION
ORIENTATION: RIGHT;LEFT
ORIENTATION: POSTERIOR;UPPER;LOWER

## 2025-05-01 ASSESSMENT — PAIN DESCRIPTION - LOCATION
LOCATION: BACK
LOCATION: BACK

## 2025-05-01 ASSESSMENT — PAIN - FUNCTIONAL ASSESSMENT: PAIN_FUNCTIONAL_ASSESSMENT: PREVENTS OR INTERFERES SOME ACTIVE ACTIVITIES AND ADLS

## 2025-05-01 NOTE — CARE COORDINATION
Case Management Assessment  Initial Evaluation    Date/Time of Evaluation: 5/1/2025 3:30 PM  Assessment Completed by: Claudia Carrillo    If patient is discharged prior to next notation, then this note serves as note for discharge by case management.    Patient Name: Lynnette Rosado                   YOB: 1929  Diagnosis: Pleural effusion [J90]  Generalized weakness [R53.1]  Acute cystitis without hematuria [N30.00]  Recurrent falls [R29.6]  Compression fracture of L4 lumbar vertebra, closed, initial encounter ([facility]) [S32.040A]  Compression fracture of L3 vertebra with routine healing, subsequent encounter [S32.030D]  Compression fracture of L4 vertebra, initial encounter ([facility]) [S32.040A]                   Date / Time: 4/30/2025  5:40 PM    Patient Admission Status: Observation   Readmission Risk (Low < 19, Mod (19-27), High > 27): No data recorded  Current PCP: Thania Brooks APRN - NP  PCP verified by CM?  (PCP is a DR that visits the Home - Dr Agusto Fierro)    Chart Reviewed: Yes      History Provided by: Child/Family, Unable to Assess (Daughter present is Melody - Dtr Jeanne is one she lives with and able to fill us in with more info - will update as relate info)  Patient Orientation: Person, Alert and Oriented    Patient Cognition: Dementia / Early Alzheimer's    Hospitalization in the last 30 days (Readmission):  No    If yes, Readmission Assessment in  Navigator will be completed.    Advance Directives:      Code Status: DNR-CC   Patient's Primary Decision Maker is: Legal Next of Kin      Discharge Planning:    Patient lives with: Family Members, Children Type of Home: House  Primary Care Giver: Other (Comment) (lives with other daughter an has caregivers and goes to a memory ( adult type  memory day care ))No other care received.  Patient Support Systems include: Children, Home Care Staff, Other (Comment) (has in home rehab caregiving services -)   Current Financial resources:    Current

## 2025-05-01 NOTE — ED NOTES
ED to inpatient nurses report      Chief Complaint:  Chief Complaint   Patient presents with    Back Pain     History if L3 compression fracture, fall x3 weeks ago, at home PT and Home doctor, daughter states she doesn't feel patient is getting better wants her evaluated     Present to ED from: home, lives with daughter     MOA:     LOC: alert to only name  Mobility: Requires assistance * 2  Oxygen Baseline: room air     Current needs required: none    Pending ED orders: none   Present condition: stable     Why did the patient come to the ED? Back pain, increased weakness, fall 3 weeks ago, pt not getting better. Frequent UTIs  What is the plan? PT/OT  Any procedures or intervention occur? None   Any safety concerns??fall risk   CODE STATUS No Order  Diet No diet orders on file    Mental Status:       Psych Assessment:   Psychosocial  Psychosocial (WDL): Exceptions to WDL  Vital signs   Vitals:    04/30/25 1753 04/30/25 1839   BP: 124/74    Pulse: 64    Resp: 14    Temp: (!) 96.7 °F (35.9 °C) 97.6 °F (36.4 °C)   TempSrc:  Oral   SpO2: 99%    Weight: 66.2 kg (146 lb)    Height: 1.6 m (5' 3\")         Vitals:  Patient Vitals for the past 24 hrs:   BP Temp Temp src Pulse Resp SpO2 Height Weight   04/30/25 1839 -- 97.6 °F (36.4 °C) Oral -- -- -- -- --   04/30/25 1753 124/74 (!) 96.7 °F (35.9 °C) -- 64 14 99 % 1.6 m (5' 3\") 66.2 kg (146 lb)      Visit Vitals  /74   Pulse 64   Temp 97.6 °F (36.4 °C) (Oral)   Resp 14   Ht 1.6 m (5' 3\")   Wt 66.2 kg (146 lb)   SpO2 99%   BMI 25.86 kg/m²        LDAs:      Meds:  Medications - No data to display       Ambulatory Status:  Presents to emergency department  because of falls (Syncope, seizure, or loss of consciousness): No, Age > 70: Yes, Altered Mental Status, Intoxication with alcohol or substance confusion (Disorientation, impaired judgment, poor safety awaremess, or inability to follow instructions): Yes, Impaired Mobility: Ambulates or transfers with assistive devices

## 2025-05-01 NOTE — H&P
[facility]  Office: [phone]  Asim Sauer DO, Orlin Constantino DO, Luis German DO, Sincere Caraballo DO, Feliciano Romero MD, Ivone Coreas MD, Alysia Jennings MD, Nguyen Hoffman MD,  Pj Crow MD, Delia Kurtz MD, Glenroy Stanley DO, Ragini Cesar MD,  Placido Mancia DO, Catherine Mason MD, Yossi Ellsworth MD, Nelson Sauer DO, Tonya De MD,  Thuan Herbert DO, Shilpa Guerrero MD, Lorrie Schmidt MD, Reny Escobar MD, Pilar Marcano MD,  Marco A John MD, Cee Negron MD, Queta Green MD, Ki Padilla DO, Billie Chamberlain MD,  Siva Shoemaker MD, Shirley Waterhouse, CNP,  Taya Padilla, CNP, Nadia Carrizales, CNP, Eduin Rice, CNP,  Rosita Trujillo, DNP, Tammy Barba, CNP, Christiana Robbins, CNP, Laureen Burroughs, CNP, Edwige Rosario, CNP, Jami Luke, CNP, Indra Carballo PA-C, Joyce Lion, CNS, Shital Valle, CNP, Kandy Casey, CNP         [facility]   IN-PATIENT SERVICE   [facility]    HISTORY AND PHYSICAL EXAMINATION            Date:   5/1/2025  Patient name:  Lynnette Rosado  Date of admission:  4/30/2025  5:40 PM  MRN:   [id number]  Account:  [id number]  YOB: 1929  PCP:    Thania Brooks APRN - NP  Room:   1TRAUMA/1TRAUMA  Code Status:    No Order      History Obtained From:     electronic medical record    History of Present Illness:     Lynnette Rosado is a 89 y.o. Non- / non  female who presents with Back Pain (History if L3 compression fracture, fall x3 weeks ago, at home PT and Home doctor, daughter states she doesn't feel patient is getting better wants her evaluated)   and is admitted to the hospital for the management of Generalized weakness.    89-year-old female with past medical history of Alzheimer dementia, hypothyroidism, hyperlipidemia, recurrent UTIs on prophylactic antibiotics presented to the ED from her facility with daughter due to concern for generalized weakness and multiple falls.  Patient was seen mid

## 2025-05-01 NOTE — CONSULTS
Orthopedic Spine Consult  [facility]                     CC/Reason for consult: L3 and L4 compression fractures    HPI:      The patient is a 89 y.o. female with significant dementia at baseline.  Per her multiple family members including the daughter that lives with her and has medical power of  for her she typically is minimally ambulatory and gets around the house with her walker.  When outside the home she uses the walker for short distances and wheelchair otherwise.  She typically around the house though is relatively independent until recently when she had a fall and has been very immobile and complaining of back pain.  Again she is quite demented at baseline and at times will say she feels fine and healthy and has no back pain, but at other times will complain of significant back pain particularly when she has been moved by nurses for her care.  Patient's family indicates a previous fall from which she sustained some pelvic fractures, but had no history of any other injuries such as compression fractures.  Unknown osteoporosis history.  CTs were obtained as part of her initial workup in the emergency room and indicated L3 and L4 fractures at which point I was contacted for consultation and recommended an MRI.  Patient is seen at the bedside and quite confused and somnolent.  Multiple family members are present.  She continues to complain of back pain with movement.  Otherwise is quite confused and oriented only to person and place at times.    Past Medical History:    Past Medical History:   Diagnosis Date    Hyperlipidemia     Macular degeneration     Thyroid disease     Urinary incontinence      Past Surgical History:    Past Surgical History:   Procedure Laterality Date    APPENDECTOMY      HAND SURGERY      JOINT REPLACEMENT      knee     Medications Prior to Admission:   Prior to Admission medications    Medication Sig Start Date End Date Taking? Authorizing Provider

## 2025-05-01 NOTE — CARE COORDINATION
Case Management Assessment  Initial Evaluation    Date/Time of Evaluation: 5/1/2025 4:16 PM  Assessment Completed by: Claudia Carrillo    If patient is discharged prior to next notation, then this note serves as note for discharge by case management.    Patient Name: Lynnette Rosado                   YOB: 1929  Diagnosis: Pleural effusion [J90]  Generalized weakness [R53.1]  Acute cystitis without hematuria [N30.00]  Recurrent falls [R29.6]  Compression fracture of L4 lumbar vertebra, closed, initial encounter ([facility]) [S32.040A]  Compression fracture of L3 vertebra with routine healing, subsequent encounter [S32.030D]  Compression fracture of L4 vertebra, initial encounter ([facility]) [S32.040A]                   Date / Time: 4/30/2025  5:40 PM    Patient Admission Status: Observation   Readmission Risk (Low < 19, Mod (19-27), High > 27): No data recorded  Current PCP: Thania Brooks APRN - NP  PCP verified by CM?  (PCP is a DR that visits the Home - Dr Agusto Fierro)    Chart Reviewed: Yes      History Provided by: Child/Family, Unable to Assess (Daughter present is Melody - Dtr Jeanne is one she lives with and able to fill us in with more info - will update as relate info)  Patient Orientation: Person, Alert and Oriented    Patient Cognition: Dementia / Early Alzheimer's    Hospitalization in the last 30 days (Readmission):  No    If yes, Readmission Assessment in  Navigator will be completed.    Advance Directives:      Code Status: DNR-CC   Patient's Primary Decision Maker is: Legal Next of Kin      Discharge Planning:    Patient lives with: Family Members, Children Type of Home: House  Primary Care Giver: Other (Comment) (lives with other daughter an has caregivers and goes to a memory ( adult type  memory day care ))  Patient Support Systems include: Children, Home Care Staff, Other (Comment) (has in home rehab caregiving services -)   Current Financial resources:    Current community resources: Other

## 2025-05-01 NOTE — CARE COORDINATION
400 Spoke with other Daughter Jeanne who pt lives with and she relates pt is not able to return home. We have sent 1 referral and she will let me know other referrals . Need OT and PT work ups -Will follow - Referral also sent to Prisca

## 2025-05-01 NOTE — ED PROVIDER NOTES
[facility] Emergency Department  [address]  [place] [address]  Phone: [phone]  Fax: [phone]    Pt Name: Lynnette Rosado  MRN: [id number]  Birthdate 3/15/1929  Date of evaluation: 4/30/25    Lynnette Rosado is a 89 y.o. female with CC: Back Pain (History if L3 compression fracture, fall x3 weeks ago, at home PT and Home doctor, daughter states she doesn't feel patient is getting better wants her evaluated)      MDM:   This is a 89-year-old female who currently lives at home with a [facility] care team who presents to the emergency department at the recommendation of the  physician who evaluated her today.  Daughter states that she also helps take care of her and even had the  help get her around today.  She has had worsening of her dementia secondary to Alzheimer's, has had issues with recurrent falls.  She was recently seen in the ED and diagnosed with a compression fracture and discharged home at that time.  However daughter admits that she feels like she can no longer care for her at home despite having multiple resources.  Patient hard of hearing, poor historian due to history of dementia.  She is overall very generally weak.  Urine cloudy, strong and foul-smelling.  Labs showing no white count.  No acute anemia.  Chemistry not showing any evidence of dehydration or electrolyte disturbances.  Troponin mild elevated at 20 which does appear to be patient's baseline.  No concerning findings on EKG. patient is on chronic Macrobid prophylaxis as prescribed by her urologist.  However she does have significant bacterial load in her urine and with her increased weakness as well as the way her urine appears and smells I have concern for infection.  Sent for culture and placed on Rocephin.  CT of the abdomen unremarkable.  CT of the low back showing a unchanged L3 compression fracture but she now also has a new L4 compression fracture.  Daughter is requesting that patient be admitted

## 2025-05-02 ENCOUNTER — ANESTHESIA (OUTPATIENT)
Dept: OPERATING ROOM | Age: 89
End: 2025-05-02
Payer: COMMERCIAL

## 2025-05-02 ENCOUNTER — APPOINTMENT (OUTPATIENT)
Dept: GENERAL RADIOLOGY | Age: 89
End: 2025-05-02
Payer: COMMERCIAL

## 2025-05-02 LAB
ANION GAP SERPL CALCULATED.3IONS-SCNC: 8 MMOL/L (ref 9–17)
BUN SERPL-MCNC: 16 MG/DL (ref 8–23)
CALCIUM SERPL-MCNC: 9 MG/DL (ref 8.6–10.4)
CHLORIDE SERPL-SCNC: 107 MMOL/L (ref 98–107)
CO2 SERPL-SCNC: 24 MMOL/L (ref 20–31)
CREAT SERPL-MCNC: 0.8 MG/DL (ref 0.5–0.9)
GFR, ESTIMATED: 67 ML/MIN/1.73M2
GLUCOSE SERPL-MCNC: 108 MG/DL (ref 70–99)
POTASSIUM SERPL-SCNC: 3.9 MMOL/L (ref 3.7–5.3)
SODIUM SERPL-SCNC: 139 MMOL/L (ref 135–144)

## 2025-05-02 PROCEDURE — 6370000000 HC RX 637 (ALT 250 FOR IP): Performed by: STUDENT IN AN ORGANIZED HEALTH CARE EDUCATION/TRAINING PROGRAM

## 2025-05-02 PROCEDURE — 6360000002 HC RX W HCPCS: Performed by: NURSE ANESTHETIST, CERTIFIED REGISTERED

## 2025-05-02 PROCEDURE — 2500000003 HC RX 250 WO HCPCS: Performed by: STUDENT IN AN ORGANIZED HEALTH CARE EDUCATION/TRAINING PROGRAM

## 2025-05-02 PROCEDURE — 3600000003 HC SURGERY LEVEL 3 BASE: Performed by: STUDENT IN AN ORGANIZED HEALTH CARE EDUCATION/TRAINING PROGRAM

## 2025-05-02 PROCEDURE — 7100000000 HC PACU RECOVERY - FIRST 15 MIN: Performed by: STUDENT IN AN ORGANIZED HEALTH CARE EDUCATION/TRAINING PROGRAM

## 2025-05-02 PROCEDURE — C1713 ANCHOR/SCREW BN/BN,TIS/BN: HCPCS | Performed by: STUDENT IN AN ORGANIZED HEALTH CARE EDUCATION/TRAINING PROGRAM

## 2025-05-02 PROCEDURE — 7100000001 HC PACU RECOVERY - ADDTL 15 MIN: Performed by: STUDENT IN AN ORGANIZED HEALTH CARE EDUCATION/TRAINING PROGRAM

## 2025-05-02 PROCEDURE — 2709999900 HC NON-CHARGEABLE SUPPLY: Performed by: STUDENT IN AN ORGANIZED HEALTH CARE EDUCATION/TRAINING PROGRAM

## 2025-05-02 PROCEDURE — 36415 COLL VENOUS BLD VENIPUNCTURE: CPT

## 2025-05-02 PROCEDURE — 6360000004 HC RX CONTRAST MEDICATION: Performed by: STUDENT IN AN ORGANIZED HEALTH CARE EDUCATION/TRAINING PROGRAM

## 2025-05-02 PROCEDURE — 51702 INSERT TEMP BLADDER CATH: CPT

## 2025-05-02 PROCEDURE — 2500000003 HC RX 250 WO HCPCS: Performed by: NURSE ANESTHETIST, CERTIFIED REGISTERED

## 2025-05-02 PROCEDURE — 96372 THER/PROPH/DIAG INJ SC/IM: CPT

## 2025-05-02 PROCEDURE — 3700000001 HC ADD 15 MINUTES (ANESTHESIA): Performed by: STUDENT IN AN ORGANIZED HEALTH CARE EDUCATION/TRAINING PROGRAM

## 2025-05-02 PROCEDURE — 6360000002 HC RX W HCPCS: Performed by: STUDENT IN AN ORGANIZED HEALTH CARE EDUCATION/TRAINING PROGRAM

## 2025-05-02 PROCEDURE — C1894 INTRO/SHEATH, NON-LASER: HCPCS | Performed by: STUDENT IN AN ORGANIZED HEALTH CARE EDUCATION/TRAINING PROGRAM

## 2025-05-02 PROCEDURE — 80048 BASIC METABOLIC PNL TOTAL CA: CPT

## 2025-05-02 PROCEDURE — 3700000000 HC ANESTHESIA ATTENDED CARE: Performed by: STUDENT IN AN ORGANIZED HEALTH CARE EDUCATION/TRAINING PROGRAM

## 2025-05-02 PROCEDURE — 3600000013 HC SURGERY LEVEL 3 ADDTL 15MIN: Performed by: STUDENT IN AN ORGANIZED HEALTH CARE EDUCATION/TRAINING PROGRAM

## 2025-05-02 PROCEDURE — 2580000003 HC RX 258: Performed by: ANESTHESIOLOGY

## 2025-05-02 PROCEDURE — 2720000010 HC SURG SUPPLY STERILE: Performed by: STUDENT IN AN ORGANIZED HEALTH CARE EDUCATION/TRAINING PROGRAM

## 2025-05-02 PROCEDURE — 99231 SBSQ HOSP IP/OBS SF/LOW 25: CPT | Performed by: STUDENT IN AN ORGANIZED HEALTH CARE EDUCATION/TRAINING PROGRAM

## 2025-05-02 PROCEDURE — G0378 HOSPITAL OBSERVATION PER HR: HCPCS

## 2025-05-02 RX ORDER — PHENYLEPHRINE HCL IN 0.9% NACL 1 MG/10 ML
SYRINGE (ML) INTRAVENOUS
Status: DISCONTINUED | OUTPATIENT
Start: 2025-05-02 | End: 2025-05-02 | Stop reason: SDUPTHER

## 2025-05-02 RX ORDER — METOCLOPRAMIDE HYDROCHLORIDE 5 MG/ML
10 INJECTION INTRAMUSCULAR; INTRAVENOUS
Status: DISCONTINUED | OUTPATIENT
Start: 2025-05-02 | End: 2025-05-02 | Stop reason: HOSPADM

## 2025-05-02 RX ORDER — SODIUM CHLORIDE 0.9 % (FLUSH) 0.9 %
5-40 SYRINGE (ML) INJECTION PRN
Status: DISCONTINUED | OUTPATIENT
Start: 2025-05-02 | End: 2025-05-02 | Stop reason: HOSPADM

## 2025-05-02 RX ORDER — DEXAMETHASONE SODIUM PHOSPHATE 4 MG/ML
INJECTION, SOLUTION INTRA-ARTICULAR; INTRALESIONAL; INTRAMUSCULAR; INTRAVENOUS; SOFT TISSUE
Status: DISCONTINUED | OUTPATIENT
Start: 2025-05-02 | End: 2025-05-02 | Stop reason: SDUPTHER

## 2025-05-02 RX ORDER — GLYCOPYRROLATE 0.2 MG/ML
0.4 INJECTION INTRAMUSCULAR; INTRAVENOUS ONCE
Status: DISCONTINUED | OUTPATIENT
Start: 2025-05-02 | End: 2025-05-02 | Stop reason: HOSPADM

## 2025-05-02 RX ORDER — OXYCODONE AND ACETAMINOPHEN 5; 325 MG/1; MG/1
1 TABLET ORAL
Status: DISCONTINUED | OUTPATIENT
Start: 2025-05-02 | End: 2025-05-02 | Stop reason: HOSPADM

## 2025-05-02 RX ORDER — LABETALOL HYDROCHLORIDE 5 MG/ML
10 INJECTION, SOLUTION INTRAVENOUS
Status: DISCONTINUED | OUTPATIENT
Start: 2025-05-02 | End: 2025-05-02 | Stop reason: HOSPADM

## 2025-05-02 RX ORDER — IPRATROPIUM BROMIDE AND ALBUTEROL SULFATE 2.5; .5 MG/3ML; MG/3ML
1 SOLUTION RESPIRATORY (INHALATION)
Status: DISCONTINUED | OUTPATIENT
Start: 2025-05-02 | End: 2025-05-02 | Stop reason: HOSPADM

## 2025-05-02 RX ORDER — SODIUM CHLORIDE, SODIUM LACTATE, POTASSIUM CHLORIDE, CALCIUM CHLORIDE 600; 310; 30; 20 MG/100ML; MG/100ML; MG/100ML; MG/100ML
INJECTION, SOLUTION INTRAVENOUS CONTINUOUS
Status: DISCONTINUED | OUTPATIENT
Start: 2025-05-02 | End: 2025-05-02 | Stop reason: HOSPADM

## 2025-05-02 RX ORDER — MIDAZOLAM HYDROCHLORIDE 2 MG/2ML
2 INJECTION, SOLUTION INTRAMUSCULAR; INTRAVENOUS
Status: DISCONTINUED | OUTPATIENT
Start: 2025-05-02 | End: 2025-05-02 | Stop reason: HOSPADM

## 2025-05-02 RX ORDER — MEPERIDINE HYDROCHLORIDE 50 MG/ML
12.5 INJECTION INTRAMUSCULAR; INTRAVENOUS; SUBCUTANEOUS EVERY 5 MIN PRN
Status: DISCONTINUED | OUTPATIENT
Start: 2025-05-02 | End: 2025-05-02 | Stop reason: HOSPADM

## 2025-05-02 RX ORDER — EPHEDRINE SULFATE/0.9% NACL/PF 25 MG/5 ML
SYRINGE (ML) INTRAVENOUS
Status: DISCONTINUED | OUTPATIENT
Start: 2025-05-02 | End: 2025-05-02 | Stop reason: SDUPTHER

## 2025-05-02 RX ORDER — DIPHENHYDRAMINE HYDROCHLORIDE 50 MG/ML
12.5 INJECTION, SOLUTION INTRAMUSCULAR; INTRAVENOUS
Status: DISCONTINUED | OUTPATIENT
Start: 2025-05-02 | End: 2025-05-02 | Stop reason: HOSPADM

## 2025-05-02 RX ORDER — NALOXONE HYDROCHLORIDE 0.4 MG/ML
INJECTION, SOLUTION INTRAMUSCULAR; INTRAVENOUS; SUBCUTANEOUS PRN
Status: DISCONTINUED | OUTPATIENT
Start: 2025-05-02 | End: 2025-05-02 | Stop reason: HOSPADM

## 2025-05-02 RX ORDER — SODIUM CHLORIDE 0.9 % (FLUSH) 0.9 %
5-40 SYRINGE (ML) INJECTION EVERY 12 HOURS SCHEDULED
Status: DISCONTINUED | OUTPATIENT
Start: 2025-05-02 | End: 2025-05-02 | Stop reason: HOSPADM

## 2025-05-02 RX ORDER — SODIUM CHLORIDE 9 MG/ML
INJECTION, SOLUTION INTRAVENOUS PRN
Status: DISCONTINUED | OUTPATIENT
Start: 2025-05-02 | End: 2025-05-02 | Stop reason: HOSPADM

## 2025-05-02 RX ORDER — LIDOCAINE HYDROCHLORIDE 10 MG/ML
INJECTION, SOLUTION EPIDURAL; INFILTRATION; INTRACAUDAL; PERINEURAL
Status: DISCONTINUED | OUTPATIENT
Start: 2025-05-02 | End: 2025-05-02 | Stop reason: SDUPTHER

## 2025-05-02 RX ORDER — HYDRALAZINE HYDROCHLORIDE 20 MG/ML
10 INJECTION INTRAMUSCULAR; INTRAVENOUS
Status: DISCONTINUED | OUTPATIENT
Start: 2025-05-02 | End: 2025-05-02 | Stop reason: HOSPADM

## 2025-05-02 RX ORDER — ROCURONIUM BROMIDE 10 MG/ML
INJECTION, SOLUTION INTRAVENOUS
Status: DISCONTINUED | OUTPATIENT
Start: 2025-05-02 | End: 2025-05-02 | Stop reason: SDUPTHER

## 2025-05-02 RX ORDER — BUPIVACAINE HYDROCHLORIDE AND EPINEPHRINE 5; 5 MG/ML; UG/ML
INJECTION, SOLUTION PERINEURAL
Status: DISPENSED
Start: 2025-05-02 | End: 2025-05-03

## 2025-05-02 RX ORDER — ONDANSETRON 2 MG/ML
4 INJECTION INTRAMUSCULAR; INTRAVENOUS
Status: DISCONTINUED | OUTPATIENT
Start: 2025-05-02 | End: 2025-05-02 | Stop reason: HOSPADM

## 2025-05-02 RX ORDER — LIDOCAINE HYDROCHLORIDE 10 MG/ML
1 INJECTION, SOLUTION EPIDURAL; INFILTRATION; INTRACAUDAL; PERINEURAL
Status: DISCONTINUED | OUTPATIENT
Start: 2025-05-02 | End: 2025-05-02 | Stop reason: HOSPADM

## 2025-05-02 RX ORDER — BUPIVACAINE HYDROCHLORIDE AND EPINEPHRINE 5; 5 MG/ML; UG/ML
INJECTION, SOLUTION EPIDURAL; INTRACAUDAL; PERINEURAL PRN
Status: DISCONTINUED | OUTPATIENT
Start: 2025-05-02 | End: 2025-05-02 | Stop reason: ALTCHOICE

## 2025-05-02 RX ORDER — FENTANYL CITRATE 50 UG/ML
INJECTION, SOLUTION INTRAMUSCULAR; INTRAVENOUS
Status: DISCONTINUED | OUTPATIENT
Start: 2025-05-02 | End: 2025-05-02 | Stop reason: SDUPTHER

## 2025-05-02 RX ORDER — OXYCODONE AND ACETAMINOPHEN 5; 325 MG/1; MG/1
2 TABLET ORAL
Status: DISCONTINUED | OUTPATIENT
Start: 2025-05-02 | End: 2025-05-02 | Stop reason: HOSPADM

## 2025-05-02 RX ORDER — PROPOFOL 10 MG/ML
INJECTION, EMULSION INTRAVENOUS
Status: DISCONTINUED | OUTPATIENT
Start: 2025-05-02 | End: 2025-05-02 | Stop reason: SDUPTHER

## 2025-05-02 RX ORDER — ONDANSETRON 2 MG/ML
INJECTION INTRAMUSCULAR; INTRAVENOUS
Status: DISCONTINUED | OUTPATIENT
Start: 2025-05-02 | End: 2025-05-02 | Stop reason: SDUPTHER

## 2025-05-02 RX ADMIN — SODIUM CHLORIDE, PRESERVATIVE FREE 10 ML: 5 INJECTION INTRAVENOUS at 20:26

## 2025-05-02 RX ADMIN — ROCURONIUM BROMIDE 40 MG: 50 INJECTION INTRAVENOUS at 12:30

## 2025-05-02 RX ADMIN — EPHEDRINE SULFATE 5 MG: 5 INJECTION INTRAVENOUS at 12:44

## 2025-05-02 RX ADMIN — SODIUM CHLORIDE, PRESERVATIVE FREE 10 ML: 5 INJECTION INTRAVENOUS at 10:15

## 2025-05-02 RX ADMIN — EPHEDRINE SULFATE 5 MG: 5 INJECTION INTRAVENOUS at 12:49

## 2025-05-02 RX ADMIN — Medication 200 MCG: at 12:36

## 2025-05-02 RX ADMIN — CEFAZOLIN 2000 MG: 1 INJECTION, POWDER, FOR SOLUTION INTRAMUSCULAR; INTRAVENOUS at 12:41

## 2025-05-02 RX ADMIN — ONDANSETRON 4 MG: 2 INJECTION, SOLUTION INTRAMUSCULAR; INTRAVENOUS at 12:55

## 2025-05-02 RX ADMIN — Medication 200 MCG: at 13:03

## 2025-05-02 RX ADMIN — PYRIDOSTIGMINE BROMIDE 60 MG: 60 TABLET ORAL at 20:26

## 2025-05-02 RX ADMIN — PYRIDOSTIGMINE BROMIDE 60 MG: 60 TABLET ORAL at 17:47

## 2025-05-02 RX ADMIN — EPHEDRINE SULFATE 5 MG: 5 INJECTION INTRAVENOUS at 12:51

## 2025-05-02 RX ADMIN — Medication 100 MCG: at 13:17

## 2025-05-02 RX ADMIN — LIDOCAINE HYDROCHLORIDE 50 MG: 10 INJECTION, SOLUTION EPIDURAL; INFILTRATION; INTRACAUDAL; PERINEURAL at 12:30

## 2025-05-02 RX ADMIN — NITROFURANTOIN MONOHYDRATE/MACROCRYSTALS 100 MG: 25; 75 CAPSULE ORAL at 17:29

## 2025-05-02 RX ADMIN — ENOXAPARIN SODIUM 40 MG: 100 INJECTION SUBCUTANEOUS at 17:30

## 2025-05-02 RX ADMIN — PROPOFOL 150 MG: 10 INJECTION, EMULSION INTRAVENOUS at 12:30

## 2025-05-02 RX ADMIN — TROSPIUM CHLORIDE 20 MG: 20 TABLET, FILM COATED ORAL at 17:30

## 2025-05-02 RX ADMIN — Medication 2000 MG: at 18:34

## 2025-05-02 RX ADMIN — EPHEDRINE SULFATE 5 MG: 5 INJECTION INTRAVENOUS at 13:02

## 2025-05-02 RX ADMIN — EPHEDRINE SULFATE 5 MG: 5 INJECTION INTRAVENOUS at 13:12

## 2025-05-02 RX ADMIN — SUGAMMADEX 200 MG: 100 INJECTION, SOLUTION INTRAVENOUS at 13:22

## 2025-05-02 RX ADMIN — DEXAMETHASONE SODIUM PHOSPHATE 4 MG: 4 INJECTION INTRA-ARTICULAR; INTRALESIONAL; INTRAMUSCULAR; INTRAVENOUS; SOFT TISSUE at 12:40

## 2025-05-02 RX ADMIN — SODIUM CHLORIDE, POTASSIUM CHLORIDE, SODIUM LACTATE AND CALCIUM CHLORIDE: 600; 310; 30; 20 INJECTION, SOLUTION INTRAVENOUS at 12:26

## 2025-05-02 RX ADMIN — Medication 100 MCG: at 12:49

## 2025-05-02 RX ADMIN — FENTANYL CITRATE 50 MCG: 50 INJECTION, SOLUTION INTRAMUSCULAR; INTRAVENOUS at 12:30

## 2025-05-02 ASSESSMENT — PAIN SCALES - GENERAL
PAINLEVEL_OUTOF10: 0
PAINLEVEL_OUTOF10: 2

## 2025-05-02 ASSESSMENT — PAIN - FUNCTIONAL ASSESSMENT: PAIN_FUNCTIONAL_ASSESSMENT: FACE, LEGS, ACTIVITY, CRY, AND CONSOLABILITY (FLACC)

## 2025-05-02 ASSESSMENT — PAIN SCALES - PAIN ASSESSMENT IN ADVANCED DEMENTIA (PAINAD)
TOTALSCORE: 1
CONSOLABILITY: NO NEED TO CONSOLE
BREATHING: NORMAL
FACIALEXPRESSION: SMILING OR INEXPRESSIVE
BODYLANGUAGE: TENSE, DISTRESSED PACING, FIDGETING

## 2025-05-02 ASSESSMENT — PAIN DESCRIPTION - DESCRIPTORS: DESCRIPTORS: SORE

## 2025-05-02 ASSESSMENT — PAIN DESCRIPTION - PAIN TYPE: TYPE: SURGICAL PAIN

## 2025-05-02 ASSESSMENT — PAIN DESCRIPTION - LOCATION: LOCATION: BACK

## 2025-05-02 NOTE — ANESTHESIA PRE PROCEDURE
Department of Anesthesiology  Preprocedure Note       Name:  Lynnette Rosado   Age:  89 y.o.  :  3/15/1929                                          MRN:  [id number]         Date:  2025      Surgeon: Surgeon(s):  Indra Fine DO    Procedure: Procedure(s):  L3 L4 KYPHOPLASTY WITH MEDTRONIC KYPHON    Medications prior to admission:   Prior to Admission medications    Medication Sig Start Date End Date Taking? Authorizing Provider   acetaminophen (TYLENOL) 500 MG tablet Take 2 tablets by mouth every 8 hours as needed for Pain 10/12/23  Yes Sarina Freeman DO   levothyroxine (SYNTHROID) 175 MCG tablet Levothyroxine Sodium TABS  Refills: 0  Active   Yes Jelani Noel MD   pyridostigmine (MESTINON) 60 MG tablet Take 1 tablet by mouth 3 times daily   Yes Jelani Noel MD B Complex-C-E-Zn (MYBEC PO) Take by mouth   Yes Jelani Noel MD   mirabegron (MYRBETRIQ) 50 MG TB24 Take 50 mg by mouth daily   Yes Jelani Noel MD   aspirin 81 MG EC tablet Aspirin 81 MG Oral Tablet  Refills: 0  Active  Patient not taking: Reported on 2023    Jelani Noel MD   calcium citrate-vitamin D (CITRACAL+D) 315-200 MG-UNIT per tablet Calcium + D TABS  Refills: 0  Active  Patient not taking: Reported on 2023    Jelani Noel MD   latanoprost (XALATAN) 0.005 % ophthalmic solution Xalatan SOLN  Refills: 0  Active    Jelani Nole MD       Current medications:    Current Facility-Administered Medications   Medication Dose Route Frequency Provider Last Rate Last Admin   • lidocaine PF 1 % injection 1 mL  1 mL IntraDERmal Once PRN Amish Lee MD       • lactated ringers infusion   IntraVENous Continuous Amish Lee MD       • sodium chloride flush 0.9 % injection 5-40 mL  5-40 mL IntraVENous 2 times per day Amish Lee MD       • sodium chloride flush 0.9 % injection 5-40 mL  5-40 mL IntraVENous PRN Amish Lee MD       • 0.9 % sodium

## 2025-05-02 NOTE — DISCHARGE INSTRUCTIONS
Orthopedic Spine Discharge Instructions:  -Mobilize as tolerated. Avoid Bending, Lifting, and Twisting (BLT) as much as possible.  -Maintain Band-Aid dressings in place. May shower in 48 hours. If dressings come off with activity and hygiene, may leave uncovered if no drainage. Otherwise may replace Band-Aids.  -Ice (20 minutes on and off 1 hour) as needed for help with swelling/pain.  -Drink plenty of fluids.  -Call the office or come to Emergency Room if signs of infection appear (hot, swollen, red, draining pus, fever).  -Take medications as prescribed.  -Follow up with Dr. Fine in his office 2 weeks after surgery/discharge. Call [phone] to schedule.

## 2025-05-02 NOTE — ANESTHESIA POSTPROCEDURE EVALUATION
Department of Anesthesiology  Postprocedure Note    Patient: Lynnette Rosado  MRN: [id number]  YOB: 1929  Date of evaluation: 5/2/2025    Procedure Summary       Date: 05/02/25 Room / Location: [address]    Anesthesia Start: 1226 Anesthesia Stop: 1334    Procedure: L3 L4 KYPHOPLASTY WITH MEDTRONIC KYPHON (Spine Lumbar) Diagnosis:       Closed compression fracture of L3 lumbar vertebra with delayed healing, subsequent encounter      Closed compression fracture of L4 lumbar vertebra with delayed healing, subsequent encounter      (Closed compression fracture of L3 lumbar vertebra with delayed healing, subsequent encounter [S32.030G])      (Closed compression fracture of L4 lumbar vertebra with delayed healing, subsequent encounter [S32.040G])    Surgeons: Indra Fine DO Responsible Provider: Amish Lee MD    Anesthesia Type: general ASA Status: 4            Anesthesia Type: No value filed.    Janet Phase I: Janet Score: 8    Janet Phase II:      Anesthesia Post Evaluation    Patient location during evaluation: PACU  Patient participation: complete - patient participated  Level of consciousness: awake and alert  Airway patency: patent  Nausea & Vomiting: no nausea and no vomiting  Cardiovascular status: hemodynamically stable  Respiratory status: nasal cannula and spontaneous ventilation  Hydration status: euvolemic  Multimodal analgesia pain management approach  Pain management: adequate    No notable events documented.

## 2025-05-02 NOTE — BRIEF OP NOTE
Brief Postoperative Note      Patient: Lynnette Rosado  YOB: 1929  MRN: [id number]    Date of Procedure: 5/2/2025    Pre-Op Diagnosis Codes:      * Closed compression fracture of L3 lumbar vertebra with delayed healing, subsequent encounter [S32.030G]     * Closed compression fracture of L4 lumbar vertebra with delayed healing, subsequent encounter [S32.040G]    Post-Op Diagnosis: Same       Procedure(s):  L3 L4 KYPHOPLASTY WITH MEDTRONIC KYPHON    Surgeon(s):  Indra Fine DO    Assistant:  * No surgical staff found *    Anesthesia: General    Estimated Blood Loss (mL): Minimal    Complications: None    Specimens:   * No specimens in log *    Implants:  Implant Name Type Inv. Item Serial No.  Lot No. LRB No. Used Action   KYPHON ONEL BONE CEMENT REF#[id number]     [id number] N/A 1 Implanted     Drains:   Urinary Catheter 05/02/25 Daniel (Active)   $ Urethral catheter insertion $ Not inserted for procedure 05/02/25 0830   Catheter Indications Urinary retention (acute or chronic), continuous bladder irrigation or bladder outlet obstruction 05/02/25 0830   Site Assessment Pink;No urethral drainage 05/02/25 0830   Urine Color Yellow 05/02/25 0830   Urine Appearance Clear 05/02/25 0830   Urine Odor Other (Comment) 05/02/25 0830   Collection Container Standard 05/02/25 0830   Securement Method Securing device (Describe) 05/02/25 0830   Catheter Care  Perineal wipes 05/02/25 0507   Catheter Best Practices  Drainage tube clipped to bed;Catheter secured to thigh;Tamper seal intact;Bag below bladder;Bag not on floor;Lack of dependent loop in tubing;Drainage bag less than half full 05/02/25 0830   Status Draining 05/02/25 0830   Output (mL) 750 mL 05/02/25 0507       [REMOVED] Urinary Catheter 05/01/25 Daniel (Removed)   Catheter Indications Urinary retention (acute or chronic), continuous bladder irrigation or bladder outlet obstruction 05/01/25 2130   Site Assessment No urethral drainage 05/01/25 2130

## 2025-05-03 LAB
MICROORGANISM SPEC CULT: ABNORMAL
SPECIMEN DESCRIPTION: ABNORMAL

## 2025-05-03 PROCEDURE — 99231 SBSQ HOSP IP/OBS SF/LOW 25: CPT | Performed by: STUDENT IN AN ORGANIZED HEALTH CARE EDUCATION/TRAINING PROGRAM

## 2025-05-03 PROCEDURE — G0378 HOSPITAL OBSERVATION PER HR: HCPCS

## 2025-05-03 PROCEDURE — 97166 OT EVAL MOD COMPLEX 45 MIN: CPT

## 2025-05-03 PROCEDURE — 97162 PT EVAL MOD COMPLEX 30 MIN: CPT

## 2025-05-03 PROCEDURE — 96372 THER/PROPH/DIAG INJ SC/IM: CPT

## 2025-05-03 PROCEDURE — 6370000000 HC RX 637 (ALT 250 FOR IP): Performed by: STUDENT IN AN ORGANIZED HEALTH CARE EDUCATION/TRAINING PROGRAM

## 2025-05-03 PROCEDURE — 2500000003 HC RX 250 WO HCPCS: Performed by: STUDENT IN AN ORGANIZED HEALTH CARE EDUCATION/TRAINING PROGRAM

## 2025-05-03 PROCEDURE — 6360000002 HC RX W HCPCS: Performed by: STUDENT IN AN ORGANIZED HEALTH CARE EDUCATION/TRAINING PROGRAM

## 2025-05-03 PROCEDURE — 51702 INSERT TEMP BLADDER CATH: CPT

## 2025-05-03 PROCEDURE — 2580000003 HC RX 258: Performed by: STUDENT IN AN ORGANIZED HEALTH CARE EDUCATION/TRAINING PROGRAM

## 2025-05-03 PROCEDURE — 97530 THERAPEUTIC ACTIVITIES: CPT

## 2025-05-03 RX ORDER — 0.9 % SODIUM CHLORIDE 0.9 %
250 INTRAVENOUS SOLUTION INTRAVENOUS ONCE
Status: COMPLETED | OUTPATIENT
Start: 2025-05-03 | End: 2025-05-03

## 2025-05-03 RX ORDER — NITROFURANTOIN 25; 75 MG/1; MG/1
100 CAPSULE ORAL EVERY 12 HOURS SCHEDULED
DISCHARGE
Start: 2025-05-03 | End: 2025-05-10

## 2025-05-03 RX ORDER — OXYCODONE AND ACETAMINOPHEN 5; 325 MG/1; MG/1
1 TABLET ORAL EVERY 6 HOURS PRN
Qty: 12 TABLET | Refills: 0 | Status: SHIPPED | OUTPATIENT
Start: 2025-05-03 | End: 2025-05-06

## 2025-05-03 RX ORDER — NITROFURANTOIN 25; 75 MG/1; MG/1
100 CAPSULE ORAL EVERY 12 HOURS SCHEDULED
Status: DISCONTINUED | OUTPATIENT
Start: 2025-05-03 | End: 2025-05-05 | Stop reason: HOSPADM

## 2025-05-03 RX ADMIN — ENOXAPARIN SODIUM 40 MG: 100 INJECTION SUBCUTANEOUS at 10:23

## 2025-05-03 RX ADMIN — NITROFURANTOIN MONOHYDRATE/MACROCRYSTALS 100 MG: 25; 75 CAPSULE ORAL at 21:03

## 2025-05-03 RX ADMIN — PYRIDOSTIGMINE BROMIDE 60 MG: 60 TABLET ORAL at 14:43

## 2025-05-03 RX ADMIN — LEVOTHYROXINE SODIUM 175 MCG: 0.05 TABLET ORAL at 06:33

## 2025-05-03 RX ADMIN — SODIUM CHLORIDE, PRESERVATIVE FREE 10 ML: 5 INJECTION INTRAVENOUS at 10:24

## 2025-05-03 RX ADMIN — Medication 2000 MG: at 02:18

## 2025-05-03 RX ADMIN — PYRIDOSTIGMINE BROMIDE 60 MG: 60 TABLET ORAL at 10:23

## 2025-05-03 RX ADMIN — ACETAMINOPHEN 1000 MG: 500 TABLET ORAL at 14:50

## 2025-05-03 RX ADMIN — SODIUM CHLORIDE 250 ML: 0.9 INJECTION, SOLUTION INTRAVENOUS at 16:16

## 2025-05-03 RX ADMIN — PYRIDOSTIGMINE BROMIDE 60 MG: 60 TABLET ORAL at 21:03

## 2025-05-03 RX ADMIN — SODIUM CHLORIDE, PRESERVATIVE FREE 10 ML: 5 INJECTION INTRAVENOUS at 21:01

## 2025-05-03 RX ADMIN — NITROFURANTOIN MONOHYDRATE/MACROCRYSTALS 100 MG: 25; 75 CAPSULE ORAL at 10:23

## 2025-05-03 NOTE — DISCHARGE INSTR - COC
Continuity of Care Form    Patient Name: Lynnette Rosado   :  3/15/1929  MRN:  [id number]    Admit date:  2025  Discharge date:  2025    Code Status Order: DNR-CC   Advance Directives:    Date/Time Healthcare Directive Type of Healthcare Directive Copy in Chart Healthcare Agent Appointed Healthcare Agent's Name Healthcare Agent's Phone Number    25 1213 Yes, patient has an advance directive for healthcare treatment  --  Yes, copy in chart  --  luis enrique  [phone]             Admitting Physician:  Yossi Ellsworth MD  PCP: Thania Brooks APRN - NP    Discharging Nurse: KRISTY Martinez  Discharging Hospital Unit/Room#: 302/302-01  Discharging Unit Phone Number: [phone]    Emergency Contact:   Extended Emergency Contact Information  Primary Emergency Contact: Yaakov,Jeanne  Home Phone: [phone]  Work Phone: [phone]  Mobile Phone: [phone]  Relation: Child  Secondary Emergency Contact: JAKUB OROZCO  Home Phone: [phone]  Work Phone: [phone]  Mobile Phone: [phone]  Relation: Child  Preferred language: English   needed? No    Past Surgical History:  Past Surgical History:   Procedure Laterality Date    APPENDECTOMY      HAND SURGERY      JOINT REPLACEMENT      knee       Immunization History:   Immunization History   Administered Date(s) Administered    COVID-19, PFIZER PURPLE top, DILUTE for use, (age 12 y+), 30mcg/0.3mL 2021, 2021, 2021       Active Problems:  Patient Active Problem List   Diagnosis Code    Acquired left ventricular hypertrophy I51.7    Cervical radiculopathy M54.12    Alzheimer's disease, unspecified (HCC) G30.9, F02.80    Essential (primary) hypertension I10    Exudative age-related macular degeneration, left eye, with inactive scar (HCC) H35.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

## 2025-05-03 NOTE — CARE COORDINATION
CM called and left message requesting call back for Mirna with [facility]. CM requested confirmation if they are able to accept patient at discharge.    1045- CM spoke with Sangeeta from [facility] and they are unable to accept patient. Additional SNF choices needed from family.    1055- Call received from Mirna with [facility] stating that she is waiting for a call back from patient's daughter. If family plans for patient to admit to memory care after SNF stay they can accept patient.    1250- CM called patient's daughter, Shonna, and she states that she will be at the hospital in 30min to discuss transitional planning.    1350- CM spoke with patient's daughter, Shonna, and she states that she no longer wants [facility]. Shonna requests referral to [facility]. Referral sent.    1410-CM called and left message requesting call back for Sterling with [facility] to see if they are able to accept.    1450- Patient's daughter requests referral to [facility]. Referral sent.    1545- CM spoke with Kurt from [facility] and she states that she will review referral and update CM tomorrow if they are able to accept patient.

## 2025-05-04 LAB
ANION GAP SERPL CALCULATED.3IONS-SCNC: 8 MMOL/L (ref 9–17)
BUN SERPL-MCNC: 22 MG/DL (ref 8–23)
CALCIUM SERPL-MCNC: 8.4 MG/DL (ref 8.6–10.4)
CHLORIDE SERPL-SCNC: 108 MMOL/L (ref 98–107)
CO2 SERPL-SCNC: 23 MMOL/L (ref 20–31)
CREAT SERPL-MCNC: 0.7 MG/DL (ref 0.5–0.9)
GFR, ESTIMATED: 79 ML/MIN/1.73M2
GLUCOSE SERPL-MCNC: 85 MG/DL (ref 70–99)
POTASSIUM SERPL-SCNC: 3.8 MMOL/L (ref 3.7–5.3)
SODIUM SERPL-SCNC: 139 MMOL/L (ref 135–144)

## 2025-05-04 PROCEDURE — 6360000002 HC RX W HCPCS: Performed by: STUDENT IN AN ORGANIZED HEALTH CARE EDUCATION/TRAINING PROGRAM

## 2025-05-04 PROCEDURE — 36415 COLL VENOUS BLD VENIPUNCTURE: CPT

## 2025-05-04 PROCEDURE — 96372 THER/PROPH/DIAG INJ SC/IM: CPT

## 2025-05-04 PROCEDURE — 2500000003 HC RX 250 WO HCPCS: Performed by: STUDENT IN AN ORGANIZED HEALTH CARE EDUCATION/TRAINING PROGRAM

## 2025-05-04 PROCEDURE — 80048 BASIC METABOLIC PNL TOTAL CA: CPT

## 2025-05-04 PROCEDURE — 6370000000 HC RX 637 (ALT 250 FOR IP): Performed by: STUDENT IN AN ORGANIZED HEALTH CARE EDUCATION/TRAINING PROGRAM

## 2025-05-04 PROCEDURE — G0378 HOSPITAL OBSERVATION PER HR: HCPCS

## 2025-05-04 PROCEDURE — 97110 THERAPEUTIC EXERCISES: CPT

## 2025-05-04 PROCEDURE — 97530 THERAPEUTIC ACTIVITIES: CPT

## 2025-05-04 PROCEDURE — 51702 INSERT TEMP BLADDER CATH: CPT

## 2025-05-04 PROCEDURE — 99231 SBSQ HOSP IP/OBS SF/LOW 25: CPT | Performed by: STUDENT IN AN ORGANIZED HEALTH CARE EDUCATION/TRAINING PROGRAM

## 2025-05-04 RX ADMIN — PYRIDOSTIGMINE BROMIDE 60 MG: 60 TABLET ORAL at 16:16

## 2025-05-04 RX ADMIN — PYRIDOSTIGMINE BROMIDE 60 MG: 60 TABLET ORAL at 21:35

## 2025-05-04 RX ADMIN — ENOXAPARIN SODIUM 40 MG: 100 INJECTION SUBCUTANEOUS at 09:09

## 2025-05-04 RX ADMIN — SODIUM CHLORIDE, PRESERVATIVE FREE 10 ML: 5 INJECTION INTRAVENOUS at 10:39

## 2025-05-04 RX ADMIN — LEVOTHYROXINE SODIUM 175 MCG: 0.05 TABLET ORAL at 06:24

## 2025-05-04 RX ADMIN — PYRIDOSTIGMINE BROMIDE 60 MG: 60 TABLET ORAL at 10:39

## 2025-05-04 RX ADMIN — SODIUM CHLORIDE, PRESERVATIVE FREE 10 ML: 5 INJECTION INTRAVENOUS at 21:36

## 2025-05-04 RX ADMIN — NITROFURANTOIN MONOHYDRATE/MACROCRYSTALS 100 MG: 25; 75 CAPSULE ORAL at 21:35

## 2025-05-04 RX ADMIN — NITROFURANTOIN MONOHYDRATE/MACROCRYSTALS 100 MG: 25; 75 CAPSULE ORAL at 09:10

## 2025-05-04 RX ADMIN — ACETAMINOPHEN 1000 MG: 500 TABLET ORAL at 02:59

## 2025-05-04 NOTE — OP NOTE
A 15 mm inflatable bone tamp was then inserted through the cannula and advanced toward the anterior cortex and watched under fluoroscopy ensuring that the entire inflatable bone tamp was within the body by referencing the radiopaque bands on AP and lateral images. This sequence was then repeated for accessing the right L4 pedicle and placing 15 mm inflatable bone tamp. Unilateral, right-sided only access was done due to getting to and across the midline with curette and balloon tamp with unilateral access.    After confirmation that both bone tamps were in appropriate position, each balloon was inflated to 0.5 cc and approximately 100 psi and correction observed on biplanar fluoroscopy. This was continued sequentially with 0.5 cc increments maintaining psi 300 or less and gradual inflation and endplate reduction observed with biplanar fluoroscopy. No breaches of lateral walls or anterior cortex noted. Right L3 balloon tamp was then deflated and removed and CDS device introduced into the cannula and internal fixation achieved with injection of Kyphon Maria T bone cement observed on live lateral fluoroscopy and spot AP images to ensure no cement extravasation. In same fashion, L4 balloon tamp was then removed and CDS device used to inject Kyphon Maria T bone cement. Once bone cement had hardened the cannulae were removed and final AP and lateral fluoroscopic images obtained demonstrating some endplate elevation and reduction, good cement fill, and no significant cement extravasation. All instrument and sponge counts were correct.    Stab incisions for pedicular access were then dressed with steri-strips and Band-Aids. Patient was then transferred from the spinal Ari table to hospital bed in the supine position, reversed from general anesthesia, and extubated without issue. Patient was then transported from the operating theatre to the post anesthesia care unit in a stable condition. Patient will be transferred back to the

## 2025-05-04 NOTE — CARE COORDINATION
CLAIRE called and left message requesting call back for Sterling with [facility]. CLAIRE spoke with Kurt from [facility] and she states that she is still reviewing referral.    0924-Call received from Kurt with [facility] stating that they are able to accept patient tomorrow pending onsite eval. Minda with [facility] will be out to complete onsite eval tomorrow morning.    1500- CM updated patient's daughter, shonna, that Jayy will be her for onsite evaluation tomorrow. If [facility] will not accept patient skilled Shonna states that she would be interested in private pay memory care.

## 2025-05-05 VITALS
OXYGEN SATURATION: 92 % | BODY MASS INDEX: 25.87 KG/M2 | DIASTOLIC BLOOD PRESSURE: 72 MMHG | RESPIRATION RATE: 17 BRPM | HEIGHT: 63 IN | WEIGHT: 146 LBS | HEART RATE: 69 BPM | TEMPERATURE: 97.9 F | SYSTOLIC BLOOD PRESSURE: 139 MMHG

## 2025-05-05 PROCEDURE — 99238 HOSP IP/OBS DSCHRG MGMT 30/<: CPT | Performed by: STUDENT IN AN ORGANIZED HEALTH CARE EDUCATION/TRAINING PROGRAM

## 2025-05-05 PROCEDURE — 6370000000 HC RX 637 (ALT 250 FOR IP): Performed by: STUDENT IN AN ORGANIZED HEALTH CARE EDUCATION/TRAINING PROGRAM

## 2025-05-05 PROCEDURE — 51702 INSERT TEMP BLADDER CATH: CPT

## 2025-05-05 PROCEDURE — 6360000002 HC RX W HCPCS: Performed by: STUDENT IN AN ORGANIZED HEALTH CARE EDUCATION/TRAINING PROGRAM

## 2025-05-05 PROCEDURE — 2500000003 HC RX 250 WO HCPCS: Performed by: STUDENT IN AN ORGANIZED HEALTH CARE EDUCATION/TRAINING PROGRAM

## 2025-05-05 PROCEDURE — 96372 THER/PROPH/DIAG INJ SC/IM: CPT

## 2025-05-05 PROCEDURE — G0378 HOSPITAL OBSERVATION PER HR: HCPCS

## 2025-05-05 RX ADMIN — SODIUM CHLORIDE, PRESERVATIVE FREE 10 ML: 5 INJECTION INTRAVENOUS at 09:30

## 2025-05-05 RX ADMIN — PYRIDOSTIGMINE BROMIDE 60 MG: 60 TABLET ORAL at 09:29

## 2025-05-05 RX ADMIN — PYRIDOSTIGMINE BROMIDE 60 MG: 60 TABLET ORAL at 13:22

## 2025-05-05 RX ADMIN — LEVOTHYROXINE SODIUM 175 MCG: 0.05 TABLET ORAL at 06:11

## 2025-05-05 RX ADMIN — ENOXAPARIN SODIUM 40 MG: 100 INJECTION SUBCUTANEOUS at 09:30

## 2025-05-05 RX ADMIN — NITROFURANTOIN MONOHYDRATE/MACROCRYSTALS 100 MG: 25; 75 CAPSULE ORAL at 09:29

## 2025-05-05 RX ADMIN — ACETAMINOPHEN 1000 MG: 500 TABLET ORAL at 13:25

## 2025-05-05 ASSESSMENT — PAIN SCALES - GENERAL: PAINLEVEL_OUTOF10: 1

## 2025-05-05 ASSESSMENT — PAIN SCALES - WONG BAKER: WONGBAKER_NUMERICALRESPONSE: NO HURT

## 2025-05-05 NOTE — CARE COORDINATION
[facility] Quality Flow/Interdisciplinary Rounds Progress Note    Quality Flow Rounds held on May 5, 2025 at 0930    Disciplines Attending:  Bedside Nurse, , and Nursing Unit Leadership    Barriers to Discharge: SNF acceptance    Anticipated Discharge Date:   5/5/25    Anticipated Discharge Disposition: SNF    [facility] RISK OF UNPLANNED READMISSION 2.0             0 Total Score        Discussed patient goal for the day, patient clinical progression, and barriers to discharge. Await [facility] onsite evaluation to decide on if they will accept patient. Will call transportation request faxed to [facility] in anticipation of  SNF acceptance today.    1055- Minda with [facility] at bedside and she confirms that they are able to accept patient at anytime. Patient's daughter Shonna requests a 2p transportation time. CLAIRE spoke with Nadia from [facility] and she is going to call around in attempt to find a 2pm transport time. KEYONA and percocet prescription faxed to [facility].    [id number]- Call received from Nadia with [facility] confirming 2p transportation time with ANDRIA     RN to call report to   [phone]

## 2025-05-05 NOTE — PLAN OF CARE
Problem: Skin/Tissue Integrity  Goal: Skin integrity remains intact  Description: 1.  Monitor for areas of redness and/or skin breakdown2.  Assess vascular access sites hourly3.  Every 4-6 hours minimum:  Change oxygen saturation probe site4.  Every 4-6 hours:  If on nasal continuous positive airway pressure, respiratory therapy assess nares and determine need for appliance change or resting period  5/2/2025 0336 by Jessica Cartagena LPN  Outcome: Progressing  5/1/2025 1800 by Vivi Raya LPN  Outcome: Progressing  Flowsheets  Taken 5/1/2025 1200  Skin Integrity Remains Intact: Monitor for areas of redness and/or skin breakdown  Taken 5/1/2025 0805  Skin Integrity Remains Intact: Monitor for areas of redness and/or skin breakdown     Problem: Safety - Adult  Goal: Free from fall injury  5/2/2025 0336 by Jessica Cartagena LPN  Outcome: Progressing  5/1/2025 1800 by Vivi Raya LPN  Outcome: Progressing  Flowsheets (Taken 5/1/2025 1800)  Free From Fall Injury: Instruct family/caregiver on patient safety     Problem: Discharge Planning  Goal: Discharge to home or other facility with appropriate resources  5/2/2025 0336 by Jessica Cartagena LPN  Outcome: Progressing  5/1/2025 1800 by Vivi Raya LPN  Outcome: Progressing  Flowsheets  Taken 5/1/2025 1600  Discharge to home or other facility with appropriate resources: Identify barriers to discharge with patient and caregiver  Taken 5/1/2025 0805  Discharge to home or other facility with appropriate resources: Identify barriers to discharge with patient and caregiver     Problem: ABCDS Injury Assessment  Goal: Absence of physical injury  5/2/2025 0336 by Jessica Cartagena LPN  Outcome: Progressing  5/1/2025 1800 by Vivi Raya LPN  Outcome: Progressing  Flowsheets (Taken 5/1/2025 1800)  Absence of Physical Injury: Implement safety measures based on patient assessment     
  Problem: Skin/Tissue Integrity  Goal: Skin integrity remains intact  Description: 1.  Monitor for areas of redness and/or skin breakdown2.  Assess vascular access sites hourly3.  Every 4-6 hours minimum:  Change oxygen saturation probe site4.  Every 4-6 hours:  If on nasal continuous positive airway pressure, respiratory therapy assess nares and determine need for appliance change or resting period  5/3/2025 1117 by Yolis Pedro RN  Outcome: Progressing  Flowsheets (Taken 5/3/2025 0855)  Skin Integrity Remains Intact:   Monitor for areas of redness and/or skin breakdown   Turn and reposition as indicated     Problem: Safety - Adult  Goal: Free from fall injury  5/3/2025 1117 by Yolis Pedro RN  Outcome: Progressing     Problem: Discharge Planning  Goal: Discharge to home or other facility with appropriate resources  5/3/2025 1117 by Yolis Pedro RN  Outcome: Progressing  Flowsheets (Taken 5/3/2025 0855)  Discharge to home or other facility with appropriate resources: Identify barriers to discharge with patient and caregiver     Problem: ABCDS Injury Assessment  Goal: Absence of physical injury  5/3/2025 1117 by Yolis Pedro, RN  Outcome: Progressing     Problem: Pain  Goal: Verbalizes/displays adequate comfort level or baseline comfort level  5/3/2025 1117 by Yolis Pedro RN  Outcome: Progressing     
  Problem: Skin/Tissue Integrity  Goal: Skin integrity remains intact  Description: 1.  Monitor for areas of redness and/or skin breakdown2.  Assess vascular access sites hourly3.  Every 4-6 hours minimum:  Change oxygen saturation probe site4.  Every 4-6 hours:  If on nasal continuous positive airway pressure, respiratory therapy assess nares and determine need for appliance change or resting period  5/4/2025 0009 by Brock West LPN  Outcome: Progressing  Flowsheets  Taken 5/3/2025 2000 by Brock West LPN  Skin Integrity Remains Intact: Monitor for areas of redness and/or skin breakdown  Taken 5/3/2025 1539 by Yolis Pedro RN  Skin Integrity Remains Intact: Monitor for areas of redness and/or skin breakdown     Problem: Safety - Adult  Goal: Free from fall injury  5/4/2025 0009 by Brock West LPN    Problem: Discharge Planning  Goal: Discharge to home or other facility with appropriate resources  5/4/2025 0009 by Brock West LPN  Outcome: Progressing  Flowsheets (Taken 5/3/2025 2000)  Discharge to home or other facility with appropriate resources: Identify barriers to discharge with patient and caregiver        
  Problem: Skin/Tissue Integrity  Goal: Skin integrity remains intact  Description: 1.  Monitor for areas of redness and/or skin breakdown2.  Assess vascular access sites hourly3.  Every 4-6 hours minimum:  Change oxygen saturation probe site4.  Every 4-6 hours:  If on nasal continuous positive airway pressure, respiratory therapy assess nares and determine need for appliance change or resting period  5/4/2025 0745 by Yolis Pedro RN  Outcome: Progressing     Problem: Safety - Adult  Goal: Free from fall injury  5/4/2025 0745 by Yolis Pedro RN  Outcome: Progressing     Problem: Discharge Planning  Goal: Discharge to home or other facility with appropriate resources  5/4/2025 0745 by Yolis Pedro RN  Outcome: Progressing     Problem: ABCDS Injury Assessment  Goal: Absence of physical injury  5/4/2025 0745 by Yolis Pedro RN  Outcome: Progressing     Problem: Pain  Goal: Verbalizes/displays adequate comfort level or baseline comfort level  Outcome: Progressing     
  Problem: Skin/Tissue Integrity  Goal: Skin integrity remains intact  Description: 1.  Monitor for areas of redness and/or skin breakdown2.  Assess vascular access sites hourly3.  Every 4-6 hours minimum:  Change oxygen saturation probe site4.  Every 4-6 hours:  If on nasal continuous positive airway pressure, respiratory therapy assess nares and determine need for appliance change or resting period  Outcome: Progressing     Problem: Safety - Adult  Goal: Free from fall injury  Outcome: Progressing     Problem: Discharge Planning  Goal: Discharge to home or other facility with appropriate resources  Outcome: Progressing     Problem: ABCDS Injury Assessment  Goal: Absence of physical injury  Outcome: Progressing     Problem: Pain  Goal: Verbalizes/displays adequate comfort level or baseline comfort level  Outcome: Progressing     
  Problem: Skin/Tissue Integrity  Goal: Skin integrity remains intact  Description: 1.  Monitor for areas of redness and/or skin breakdown2.  Assess vascular access sites hourly3.  Every 4-6 hours minimum:  Change oxygen saturation probe site4.  Every 4-6 hours:  If on nasal continuous positive airway pressure, respiratory therapy assess nares and determine need for appliance change or resting period  Outcome: Progressing  Flowsheets (Taken 5/1/2025 0115)  Skin Integrity Remains Intact:   Monitor for areas of redness and/or skin breakdown   Assess need for specialty bed   Positioning devices     Problem: Safety - Adult  Goal: Free from fall injury  Outcome: Progressing     Problem: Discharge Planning  Goal: Discharge to home or other facility with appropriate resources  Outcome: Progressing  Flowsheets (Taken 5/1/2025 0115)  Discharge to home or other facility with appropriate resources:   Identify barriers to discharge with patient and caregiver   Arrange for needed discharge resources and transportation as appropriate   Identify discharge learning needs (meds, wound care, etc)   Refer to discharge planning if patient needs post-hospital services based on physician order or complex needs related to functional status, cognitive ability or social support system     Problem: ABCDS Injury Assessment  Goal: Absence of physical injury  Outcome: Progressing     
  Problem: Skin/Tissue Integrity  Goal: Skin integrity remains intact  Description: 1.  Monitor for areas of redness and/or skin breakdown2.  Assess vascular access sites hourly3.  Every 4-6 hours minimum:  Change oxygen saturation probe site4.  Every 4-6 hours:  If on nasal continuous positive airway pressure, respiratory therapy assess nares and determine need for appliance change or resting period  Outcome: Progressing  Flowsheets (Taken 5/1/2025 0805)  Skin Integrity Remains Intact: Monitor for areas of redness and/or skin breakdown     Problem: Safety - Adult  Goal: Free from fall injury  Outcome: Progressing  Flowsheets (Taken 5/1/2025 1800)  Free From Fall Injury: Instruct family/caregiver on patient safety     Problem: Discharge Planning  Goal: Discharge to home or other facility with appropriate resources  Outcome: Progressing  Flowsheets (Taken 5/1/2025 0805)  Discharge to home or other facility with appropriate resources: Identify barriers to discharge with patient and caregiver     Problem: ABCDS Injury Assessment  Goal: Absence of physical injury  Outcome: Progressing  Flowsheets (Taken 5/1/2025 1800)  Absence of Physical Injury: Implement safety measures based on patient assessment     
resources and transportation as appropriate   Identify discharge learning needs (meds, wound care, etc)   Refer to discharge planning if patient needs post-hospital services based on physician order or complex needs related to functional status, cognitive ability or social support system  5/2/2025 0336 by Jessica Cartagena LPN  Outcome: Progressing     Problem: ABCDS Injury Assessment  Goal: Absence of physical injury  5/2/2025 1529 by Agatha Villarreal LPN  Outcome: Progressing  5/2/2025 0336 by Jessica Cartagena LPN  Outcome: Progressing     Problem: Pain  Goal: Verbalizes/displays adequate comfort level or baseline comfort level  Outcome: Progressing  Flowsheets (Taken 5/2/2025 0830)  Verbalizes/displays adequate comfort level or baseline comfort level:   Encourage patient to monitor pain and request assistance   Assess pain using appropriate pain scale   Administer analgesics based on type and severity of pain and evaluate response     
resources:   Identify barriers to discharge with patient and caregiver   Arrange for needed discharge resources and transportation as appropriate  Taken 5/2/2025 0830 by Agatha Villarreal LPN  Discharge to home or other facility with appropriate resources:   Identify barriers to discharge with patient and caregiver   Arrange for needed discharge resources and transportation as appropriate   Identify discharge learning needs (meds, wound care, etc)   Refer to discharge planning if patient needs post-hospital services based on physician order or complex needs related to functional status, cognitive ability or social support system     Problem: ABCDS Injury Assessment  Goal: Absence of physical injury  5/3/2025 0408 by Reyes, Abigail, RN  Outcome: Progressing  5/2/2025 1529 by Agatha Villarreal LPN  Outcome: Progressing     Problem: Pain  Goal: Verbalizes/displays adequate comfort level or baseline comfort level  5/3/2025 0408 by Reyes, Abigail, RN  Outcome: Progressing  5/2/2025 1529 by Agatha Villarreal LPN  Outcome: Progressing  Flowsheets (Taken 5/2/2025 0830)  Verbalizes/displays adequate comfort level or baseline comfort level:   Encourage patient to monitor pain and request assistance   Assess pain using appropriate pain scale   Administer analgesics based on type and severity of pain and evaluate response

## 2025-05-05 NOTE — PROGRESS NOTES
..PALLIATIVE CARE TEAM    Patient: Lynnette Rosado  Room: 302/302-01    Reason For Consult   Goals of care evaluation  Distress management  Symptom Management  Guidance and support  Facilitate communications  Assistance in coordinating care  Recommendations for the above    Impression: Lynnette Rosado is a 89 y.o. year old female with  has a past medical history of Hyperlipidemia, Macular degeneration, Thyroid disease, and Urinary incontinence..  Currently hospitalized for the management of Generalized weakness.  The Palliative Care Team is following to assist with goals of care and support.     Code Status  DNR-CC  PLAN:   - the patient is discharged to [facility] today   - she is a DNRCC   - she is S/P kyphoplasty 5/2    - her daughter Jeanne states that she is the HCPOA, and I request a copy   - will follow for support.   Vital Signs:  /72   Pulse 69   Temp 97.9 °F (36.6 °C) (Axillary)   Resp 17   Ht 1.6 m (5' 3\")   Wt 66.2 kg (146 lb)   SpO2 92%   BMI 25.86 kg/m²     Patient Active Problem List   Diagnosis    Acquired left ventricular hypertrophy    Cervical radiculopathy    Alzheimer's disease, unspecified (formerly [facility])    Essential (primary) hypertension    Exudative age-related macular degeneration, left eye, with inactive scar (formerly [facility])    Gait disturbance    Generalized arthritis    Heart disease    Herpes zoster without complication    Incontinence    Iron deficiency anemia    Myasthenia gravis with exacerbation, ocular (formerly [facility])    Overactive bladder    Sensorineural hearing loss (SNHL) of both ears    ZEESHAN (stress urinary incontinence, female)    Venous insufficiency of both lower extremities    Vitamin D deficiency    Closed compression fracture of L4 lumbar vertebra, initial encounter (formerly [facility])    Generalized weakness    Compression fracture of L4 lumbar vertebra, closed, initial encounter (formerly [facility])       Palliative Interaction:Patient is sleeping soundly and she is on room air. She is to be discharged to [place] 
[facility]  Office: [phone]  Asim Sauer DO, Orlin Constantino DO, Luis German DO, Sincere Caraballo DO, Feliciano Romero MD, Ivone Coreas MD, Alysia Jennings MD, Nguyen Hoffman MD,  Pj Crow MD, Delia Kurtz MD, Ragini Cesar MD,  Placido Mancia DO, Catherine Mason MD, Yossi Ellsworth MD, Nelson Sauer DO, Tonya De MD,  Thuan Herbert DO, Lorrie Schmidt MD, Reny Escobar MD, Pilar Marcano MD,  Marco A John MD, Cee Negron MD, Queta Green MD, Joseph Trevino MD, Bradley Briggs MD, Sonia Peterson MD, Eduin Cook DO, Judit De La Paz MD, Siva Shoemaker MD, Placido Parker MD, Mohsin Reza, MD, Joshua Tao MD, Shirley Waterhouse, CNP,  Taya Padilla, CNP, Eduin Rice, CNP,  Rosita Trujillo, JOCELYN, Tammy Barba, CNP, Christiana Robbins, CNP, Edwige Rosario, CNP, Jami Luke, CNP, Linda Samayoa, PA-C, Luz Hatfield, CNP, Kishore Putnam, CNP,  Earlene Burton, CNP, Brenda Schneider, CNP, Rico Carvalho, PA-C, Nadia Carrizales, CNP,  Joyce Lion, CNS, Laureen Burroughs, CNP, Kandy Casey, CNP,   Alissa Hager, CNP         [facility]   IN-PATIENT SERVICE   [facility]    Progress Note    5/4/2025    11:56 AM    Name:   Lynnette Rosado  MRN:     [id number]     Acct:      [id number]   Room:   302/302-01   Day:  0  Admit Date:  4/30/2025  5:40 PM    PCP:   Thania Brooks APRN - NP  Code Status:  DNR-CC    Subjective:     Patient was seen in follow-up for L3-L4 compression fractures. She is eating lunch and has no specific complaints. Awaiting placement.     Medications:     Allergies:    Allergies   Allergen Reactions    Azithromycin Anaphylaxis    Ciprofloxacin Anaphylaxis    Codeine        Current Meds:   Scheduled Meds:    nitrofurantoin (macrocrystal-monohydrate)  100 mg Oral 2 times per day    levothyroxine  175 mcg Oral Daily    pyRIDostigmine  60 mg Oral TID    sodium chloride flush  5-40 mL IntraVENous 2 times per day    enoxaparin  40 mg SubCUTAneous Daily 
Occupational Therapy    [facility]  Occupational Therapy Not Seen Note    DATE: 2025    NAME: Lynnette Rosado  MRN: [id number]   : 3/15/1929      Patient not seen this date for Occupational Therapy due to:    Other: 25 CT L spine: IMPRESSION:  1. Mild acute L4 superior endplate compression fracture new from 2025.  2. Mild subacute L3 inferior endplate compression fracture without significant change from 2025.  3. No acute abnormality in the abdomen or pelvis.  4. Trace bilateral pleural effusions.  5. Colonic diverticulosis without evidence of acute diverticulitis.     Other: xray L foot 25: Nondisplaced fracture base of the 5th metatarsal  Pt has ortho spine consult.     PLAN: Continue to pursue OT evaluation as able pending clarification of activity due to L4 compression fx and recent L 5th metatarsal fx and Palliative consult.    Electronically signed by JOSÉ MIGUEL DENISE on 2025 at 10:03 AM    
Occupational Therapy  Facility/Department: [address]  Occupational Therapy Initial Assessment    Name: Lynnette Rosado  : 3/15/1929  MRN: [id number]  Date of Service: 5/3/2025    Chief Complaint   Patient presents with    Back Pain     History if L3 compression fracture, fall x3 weeks ago, at home PT and Home doctor, daughter states she doesn't feel patient is getting better wants her evaluated        Discharge Recommendations:  Patient would benefit from continued therapy after discharge  OT Equipment Recommendations  Equipment Needed: Yes (shower chair, grab bars,)       Patient Diagnosis(es): The primary encounter diagnosis was Generalized weakness. Diagnoses of Recurrent falls, Compression fracture of L4 vertebra, initial encounter ([facility]), Compression fracture of L3 vertebra with routine healing, subsequent encounter, Pleural effusion, Acute cystitis without hematuria, and Compression fracture of L4 lumbar vertebra, closed, initial encounter ([facility]) were also pertinent to this visit.  Past Medical History:  has a past medical history of Hyperlipidemia, Macular degeneration, Thyroid disease, and Urinary incontinence.  Past Surgical History:  has a past surgical history that includes joint replacement; Hand surgery; and Appendectomy.      Assessment  Performance deficits / Impairments: Decreased functional mobility ;Decreased ADL status;Decreased strength;Decreased safe awareness;Decreased cognition;Decreased endurance;Decreased balance  Assessment: Pt presents to this hospitalization s/p fall with L3-4 compression fxs. Kyphoplasty completed by Dr. Fine on 25. Pt has baseline cognitive deficits due to Alzheimer's and lives with daughter where pt provides 24 hr care, and receives memory care and PT services. Pt at this time requires Max Ax2 for bed mobility, sit to stands and is DEP for transfers due to lacking ability to stand appropriately likely due to pain and older age. Pt will continue to benefit from 
[facility]  Office: [phone]  Asim Sauer DO, Orlin Constantino DO, Luis German DO, Sincere Caraballo DO, Feliciano Romero MD, Ivone Coreas MD, Alysia Jennings MD, Nguyen Hoffman MD,  Pj Crow MD, Delia Kurtz MD, Ragini Cesar MD,  Placido Mancia DO, Catherine Mason MD, Yossi Ellsworth MD, Nelson Sauer DO, Tonya De MD,  Thuan Herbert DO, Lorrie Schmidt MD, Reny Escobar MD, Pilar Marcano MD,  Marco A John MD, Cee Negron MD, Queta Green MD, Joseph Trevino MD, Bradley Briggs MD, Sonia Peterson MD, Eduin Cook DO, Judit De La Paz MD, Siva Shoemaker MD, Placido Parker MD, Mohsin Reza, MD, Joshua Tao MD, Shirley Waterhouse, CNP,  Taya Padilla, CNP, Eduin Rice, CNP,  Rosita Trujillo, JOCELYN, Tammy Barba, CNP, Christiana Robbins, CNP, Edwige Rosario, CNP, Jami Luke, CNP, Linda Samayoa, PA-C, Luz Hatfield, CNP, Kishore Putnam, CNP,  Earlene Burton, CNP, Brenda Schneider, CNP, Rico Carvalho, PA-C, Nadia Carrizales, CNP,  Joyce Lion, CNS, Laureen Burroughs, CNP, Kandy Casey, CNP,   Alissa Hager, CNP         [facility]   IN-PATIENT SERVICE   [facility]    Progress Note    5/5/2025    9:48 AM    Name:   Lynnette Rosado  MRN:     [id number]     Acct:      [id number]   Room:   302/302-01   Day:  0  Admit Date:  4/30/2025  5:40 PM    PCP:   Thania Brooks APRN - NP  Code Status:  DNR-CC    Subjective:     Patient was seen in follow-up for L3-L4 compression fractures. She is resting comfortably and has no complaints. Awaiting placement.     Medications:     Allergies:    Allergies   Allergen Reactions    Azithromycin Anaphylaxis    Ciprofloxacin Anaphylaxis    Codeine        Current Meds:   Scheduled Meds:    nitrofurantoin (macrocrystal-monohydrate)  100 mg Oral 2 times per day    levothyroxine  175 mcg Oral Daily    pyRIDostigmine  60 mg Oral TID    sodium chloride flush  5-40 mL IntraVENous 2 times per day    enoxaparin  40 mg SubCUTAneous Daily 
Physical Therapy        Physical Therapy Cancel Note      DATE: 2025    NAME: Lynnette Rosado  MRN: [id number]   : 3/15/1929      Patient not seen this date for Physical Therapy due to: 25 CT L spine: MPRESSION:1. Mild acute L4 superior endplate compression fracture new from 2025.  2. Mild subacute L3 inferior endplate compression fracture without significant change from 2025.  3. No acute abnormality in the abdomen or pelvis.  4. Trace bilateral pleural effusions.  5. Colonic diverticulosis without evidence of acute diverticulitis.    Other: xray L foot 25: Nondisplaced fracture base of the 5th metatarsal  Pt has ortho spine consult.   PLAN: continue to pursue PT evaluation as able pending clarification of activity due to L4 compression fx and recent L 5th metatarsal fx and Palliative consult.    ADDENDUM: Per [facility] response from Dr. Fine: Okay for therapy , probably wouldn't do any thing out of bed. Pt scheduled for MRI L spine and kyphoplasty L3-4 tomorrow at 12:30.  PT spoke w/ Dr. Ellsworth requesting WBAT L LE w/ surgical shoe order. (Daughter indicate pt had surgical shoe and was WBAT but not wearing lately due to it was catching on steps; family doctor order surgical shoe about 2wks ago.)  PLAN: continue to pursue PT evaluation as able post op.     Electronically signed by Maryam Zamora PT on 2025 at 8:22 AM      
Physical Therapy  Facility/Department: [address]   Physical Therapy Initial Evaluation    Patient Name: Lynnette Rosado        MRN: [id number]    : 3/15/1929    Date of Service: 5/3/2025    Chief Complaint   Patient presents with    Back Pain     History if L3 compression fracture, fall x3 weeks ago, at home PT and Home doctor, daughter states she doesn't feel patient is getting better wants her evaluated     Past Medical History:  has a past medical history of Hyperlipidemia, Macular degeneration, Thyroid disease, and Urinary incontinence.  Past Surgical History:  has a past surgical history that includes joint replacement; Hand surgery; and Appendectomy.    Discharge Recommendations  Discharge Recommendations: Patient would benefit from continued therapy after discharge  PT Equipment Recommendations  Other: to be determined at SNF    Assessment  Body Structures, Functions, Activity Limitations Requiring Skilled Therapeutic Intervention: Decreased functional mobility , Decreased ROM, Decreased balance, Decreased endurance, Decreased safe awareness, Increased pain, Decreased strength, Decreased posture, Decreased vision/visual deficit  Assessment: Pt lives w/ daughter and normally walk w/ RW with supervision at least in home. Pt had fall w/ L 5th metatarsal fx ~2wks ago and has surgical shoe but was not wearing recently due to getting stuck on steps. At PT evaluation s/p L3-4 kyphoplasty 25, pt was max Ax2 OOB and dependent x2 pivot bed to recliner after unable to rise fully for sit to stand w/ RW bedside. Pt has back pain but unable to rate due to dementia.  Pt is not safe to return to prior living arrangements and will benefit from acute PT and PT at discharge to increase functional mobility, safety, balance and independence w/ transfers and gait.  Therapy Prognosis: Good  Decision Making: Medium Complexity  Requires PT Follow-Up: Yes  Activity Tolerance  Activity Tolerance: Patient limited by pain, Treatment 
Report given to Nurse Reyna. No further questions after the report.  
Ride was at 1503 via stretcher. Report given. Patient alert to self, daughter at bedside. No c/o discomfort. Personal belonging listed sent with patient.   
Spiritual Health History and Assessment/Progress Note  [facility]    (P) Palliative Care,  , Life Adjustments, Adjustment to illness,      Name: Lynnette Rosado MRN: [id number]    Age: 89 y.o.     Sex: female   Language: English   Jehovah's witness: Amish   Generalized weakness     Date: 5/3/2025            Total Time Calculated: (P) 15 min              Spiritual Assessment began in [address]        Referral/Consult From: (P) Palliative Care   Encounter Overview/Reason: (P) Palliative Care  Service Provided For: (P) Patient, Family       introduced self and pastoral role to Pt. And family. Pt. Was welcoming and open to conversation.  provided pastoral support and care. Provided empathic  listening and comfort.  offered prayer as a source of care, strength and hope.Pt. will be going to a facility soon.    Essence, Belief, Meaning:   Patient has beliefs or practices that help with coping during difficult times  Family/Friends have beliefs or practices that help with coping during difficult times      Importance and Influence:  Patient has spiritual/personal beliefs that influence decisions regarding their health  Family/Friends have spiritual/personal beliefs that influence decisions regarding the patient's health    Community:  Patient feels well-supported. Support system includes: Children  Family/Friends feel well-supported. Support system includes: Parent/s and Extended family    Assessment and Plan of Care:     Patient Interventions include: Facilitated expression of thoughts and feelings and Explored spiritual coping/struggle/distress  Family/Friends Interventions include: Facilitated expression of thoughts and feelings    Patient Plan of Care: Spiritual Care available upon further referral  Family/Friends Plan of Care: Spiritual Care available upon further referral    Electronically signed by Chaplain CELINE on 5/3/2025 at 7:48 PM    05/03/25 1945   Encounter Summary 
Spiritual Health History and Assessment/Progress Note  [facility]    (P) Palliative Care,  , (P) Life Adjustments, Adjustment to illness,      Name: Lynnette Rosado MRN: [id number]    Age: 89 y.o.     Sex: female   Language: English   Pentecostalism: Denominational   Generalized weakness     Date: 5/1/2025            Total Time Calculated: (P) 15 min              Spiritual Assessment began in [facility] [address] [place]        Referral/Consult From: (P) Palliative Care   Encounter Overview/Reason: (P) Palliative Care  Service Provided For: (P) Patient       introduced self and role.  visited Palliative care Pt. On floor.  provided support and pastoral care. Prayer was offered for comfort, strength and peace of mind. Empathic listening was offered. And encouragement. Pt. Was welcoming and open for conversation and discussion.    Essence, Belief, Meaning:   Patient has beliefs or practices that help with coping during difficult times  Family/Friends No family/friends present      Importance and Influence:  Patient has spiritual/personal beliefs that influence decisions regarding their health  Family/Friends No family/friends present    Community:  Patient feels well-supported. Support system includes: Children  Family/Friends No family/friends present    Assessment and Plan of Care:     Patient Interventions include: Facilitated expression of thoughts and feelings and Explored spiritual coping/struggle/distress  Family/Friends Interventions include: No family/friends present    Patient Plan of Care: Spiritual Care available upon further referral  Family/Friends Plan of Care: No family/friends present    Electronically signed by Chaplain CELINE on 5/1/2025 at 8:03 PM    05/01/25 1958   Encounter Summary   Encounter Overview/Reason Palliative Care   Service Provided For Patient   Referral/Consult From Palliative Care   Support System Children   Last Encounter  05/01/25   Complexity of Encounter 
[facility]  Office: [phone]  Asim Sauer DO, Orlin Constantino DO, Luis German DO, Sincere Caraballo DO, Feliciano Romero MD, Ivone Coreas MD, Alysia Jennings MD, Nguyen Hoffman MD,  Pj Crow MD, Delia Kurtz MD, Ragini Cesar MD,  Placido Mancia DO, Catherine Mason MD, Yossi Ellsworth MD, Nelson Sauer DO, Tonya De MD,  Thuan Herbert DO, Lorrie Schmidt MD, Reny Escobar MD, Pilar Marcano MD,  Marco A John MD, Cee Negron MD, Queta Green MD, Joseph Trevino MD, Bradley Briggs MD, Sonia Peterson MD, Eduin Cook DO, Judit De La Paz MD, Siva Shoemaker MD, Placido Parker MD, Mohsin Reza, MD, Joshua Tao MD, Shirley Waterhouse, CNP,  Taya Padilla, CNP, Eduin Rice, CNP,  Rosita Trujillo, JOCELYN, Tammy Barba, CNP, Christiana Robbins, CNP, Edwige Rosario, CNP, Jami Luke, CNP, Linda Samayoa, PA-C, Luz Hatfield, CNP, Kishore Putnam, CNP,  Earlene Burton, CNP, Brenda Schneider, CNP, Rico Carvalho, PA-C, Nadia Carrizales, CNP,  Joyce Lion, CNS, Laureen Burroughs, CNP, Kandy Casey, CNP,   Alissa Hager, CNP         [facility]   IN-PATIENT SERVICE   [facility]    Progress Note    5/2/2025    9:00 AM    Name:   Lynnette Rosado  MRN:     [id number]     Acct:      [id number]   Room:   302/302-01   Day:  0  Admit Date:  4/30/2025  5:40 PM    PCP:   Thania Brooks APRN - NP  Code Status:  DNR-CC    Subjective:     Patient was seen in follow-up for L3-L4 compression fractures. She is resting comfortably and has no complaints this morning. Orthopedic surgery is following; plan for kyphoplasty today.     Medications:     Allergies:    Allergies   Allergen Reactions    Azithromycin Anaphylaxis    Ciprofloxacin Anaphylaxis    Codeine        Current Meds:   Scheduled Meds:    levothyroxine  175 mcg Oral Daily    trospium  20 mg Oral BID AC    pyRIDostigmine  60 mg Oral TID    sodium chloride flush  5-40 mL IntraVENous 2 times per day    enoxaparin  40 mg 
[facility]  Office: [phone]  Asim Sauer DO, Orlin Constantino DO, Luis German DO, Sincere Caraballo DO, Feliciano Romero MD, Ivone Coreas MD, Alysia Jennings MD, Nguyen Hoffman MD,  Pj Crow MD, Delia Kurtz MD, Ragini Cesar MD,  Placido Mancia DO, Catherine Mason MD, Yossi Ellsworth MD, Nelson Sauer DO, Tonya De MD,  Thuan Herbert DO, Lorrie Schmidt MD, Reny Escobar MD, Pilar Marcano MD,  Marco A John MD, Cee Negron MD, Queta Green MD, Joseph Trevino MD, Bradley Briggs MD, Sonia Peterson MD, Eduin Cook DO, Judit De La Paz MD, Siva Shoemaker MD, Placido Parker MD, Mohsin Reza, MD, Joshua Tao MD, Shirley Waterhouse, CNP,  Taya Padilla, CNP, Eduin Rice, CNP,  Rosita Trujillo, JOCELYN, Tammy Barba, CNP, Christiana Robbins, CNP, Edwige Rosario, CNP, Jami Luke, CNP, Linda Samayoa, PA-C, Luz Hatfield, CNP, Kishore Putnam, CNP,  Earlene Burton, CNP, Brenda Schneider, CNP, Rico Carvalho, PA-C, Nadia Carrizales, CNP,  Joyce Lion, CNS, Laureen Burroughs, CNP, Kandy Casey, CNP,   Alissa Hager, CNP         [facility]   IN-PATIENT SERVICE   [facility]    Progress Note    5/3/2025    5:37 AM    Name:   Lynnette Rosado  MRN:     [id number]     Acct:      [id number]   Room:   302/302-01   Day:  0  Admit Date:  4/30/2025  5:40 PM    PCP:   Thania Brooks APRN - NP  Code Status:  DNR-CC    Subjective:     Patient was seen in follow-up for L3-L4 compression fractures. She is s/p kyphoplasty and is resting comfortably. Awaiting placement.     Medications:     Allergies:    Allergies   Allergen Reactions    Azithromycin Anaphylaxis    Ciprofloxacin Anaphylaxis    Codeine        Current Meds:   Scheduled Meds:    nitrofurantoin (macrocrystal-monohydrate)  100 mg Oral 2 times per day    levothyroxine  175 mcg Oral Daily    trospium  20 mg Oral BID AC    pyRIDostigmine  60 mg Oral TID    sodium chloride flush  5-40 mL IntraVENous 2 times per day    
Vitals were taken and medications were reconciled.     DARINEL Billy  8:51 AM    
[facility]  Office: [phone]  Asim Sauer DO, Orlin Constantino DO, Luis German DO, Sincere Caraballo DO, Feliciano Romero MD, Ivone Coreas MD, Alysia Jennings MD, Nguyen Hoffman MD,  Pj Crow MD, Delia Kurtz MD, Ragini Cesar MD,  Placido Mancia DO, Catherine Mason MD, Yossi Ellsworth MD, Nelson Sauer DO, Tonya De MD,  Thuan Herbert DO, Lorrie Schmidt MD, Reny Escobar MD, Pilar Marcano MD,  Marco A John MD, Cee Negron MD, Queta Green MD, Joseph Trevino MD, Bradley Briggs MD, Sonia Peterson MD, Eduin Cook DO, Judit De La Paz MD, Siva Shoemaker MD, Placido Parker MD, Mohsin Reza, MD, Joshua Tao MD, Shirley Waterhouse, CNP,  Taya Padilla, CNP, Eduin Rice, CNP,  Rosita Trujillo, JOCELYN, Tammy Barba, CNP, Christiana Robbins, CNP, Edwige Rosario, CNP, Jami Luke, CNP, Linda Samayoa, PA-C, Luz Hatfield, CNP, Kishore Putnam, CNP,  Earlene Burton, CNP, Brenda Schneider, CNP, Rico Carvalho, PA-C, Nadia Carrizales, CNP,  Joyce Lion, CNS, Laureen Burroughs, CNP, Kandy Casey, CNP,   Alissa Hager, CNP         [facility]   IN-PATIENT SERVICE   [facility]    Progress Note    5/1/2025    8:35 AM    Name:   Lynnette Rosado  MRN:     [id number]     Acct:      [id number]   Room:   302/302-01   Day:  0  Admit Date:  4/30/2025  5:40 PM    PCP:   Thania Brooks APRN - NP  Code Status:  DNR-CC    Subjective:     Patient was seen in follow-up for L3-L4 compression fractures. She is resting comfortably and is alert and oriented only to self (this is her baseline per family). Plan for MRI of the lumbar spine today.     Medications:     Allergies:    Allergies   Allergen Reactions    Azithromycin Anaphylaxis    Ciprofloxacin Anaphylaxis    Codeine        Current Meds:   Scheduled Meds:    levothyroxine  175 mcg Oral Daily    trospium  20 mg Oral BID AC    pyRIDostigmine  60 mg Oral TID    sodium chloride flush  5-40 mL IntraVENous 2 times per day    enoxaparin  
Writer messaged Dr. Ellsworth regarding the pt only having a urine output for the day of 140mL and let him know that the pt's daughter stated that she no longer gives the pt myrbetriq d/t the side effect of increased confusion and it not controlling her incontinence. Dr. Ellsworth placed order for fluid bolus.   
bedrails but pt pull herself back and down, 2nd and 3rd time daughters each held a hand and PT was able to stand pt and get her to initiate LE wt bearing ~50% of the time and stood no more than 30sec    Ambulation  Assistance: Unable to assess  Comments: not safe or tolerable currently               Balance   Balance  Posture: Poor  Sitting - Static: Fair;- (greyson bedrails)  Sitting - Dynamic: Poor;+ (greyson bedrails)  Standing - Static: Poor;-  Standing - Dynamic: Poor;-    Exercise PROM legs supine hip flex, hip abduction, hip rotation supine and hooklying, ankle ROM/gentle heelcord stretch       Patient Education  Patient Education  Education Given To: Patient  Education Provided: Role of Therapy;Transfer Training;Plan of Care;Mobility Training;Family Education;Home Exercise Program  Education Provided Comments: educate family that pt currently needs maxi move or maxi bishnu; educate on soft foods vs puree and option magic cup and ensure that they can request at SNF  Education Method: Demonstration;Verbal;Teach Back  Barriers to Learning: Cognition;Other (Comment) (pain)  Education Outcome: Continued education needed    Plan  Physical Therapy Plan  General Plan:  (4-5x/wk)  Current Treatment Recommendations: Strengthening, ROM, Balance training, Functional mobility training, Transfer training, Gait training, Endurance training, Safety education & training, Therapeutic activities, Home exercise program    Goals  Patient Goals   Patient Goals : family wants SNF  Short Term Goals  Time Frame for Short Term Goals: 14  Short Term Goal 1: Pt will be SBA hospital bed mobility.  Short Term Goal 2: Pt will be SBA transfers with RW.  Short Term Goal 3: Pt will be CGA 50ft with RW WBAT L LE w/ surgical shoe.    Minutes  PT Individual Minutes  Time In: 1321  Time Out: 1410  Minutes: 49  Time Code Minutes  Timed Code Treatment Minutes: 45 Minutes    Electronically signed by Maryam Zamora PT on 5/4/25 at 2:28 PM EDT

## 2025-05-05 NOTE — DISCHARGE SUMMARY
[facility]  Office: [phone]  Asim Sauer DO, Orlin Constantino DO, Luis German DO, Sincere Caraballo DO, Feliciano Romero MD, Ivone Coreas MD, Alysia Jennings MD, Nguyen Hoffman MD,  Pj Crow MD, Delia Kurtz MD, Ragini Cesar MD,  Placido Mancia DO, Catherine Mason MD, Yossi Ellsworth MD, Nelson Sauer DO, Tonya De MD,  hTuan Herbert DO, Lorrie Schmidt MD, Reny Escobar MD, Pilar Marcano MD,  Marco A John MD, Cee Negron MD, Queta Green MD, Joseph Trevino MD, Bradley Briggs MD, Sonia Peterson MD, Eduin Cook DO, Judit De La Paz MD, Siva Shoemaker MD, Placido Parker MD, Mohsin Reza, MD, Joshua Tao MD, Shirley Waterhouse, CNP,  Taya Padilla, CNP, Eduin Rice, CNP,  Rosita Trujillo, DNP, Tammy Barba, CNP, Christiana Robbins, CNP, Edwige Rosario, CNP, Jami Luke, CNP, Linda Samayoa, PA-C, Luz Hatfield, CNP, Kishore Putnam, CNP,  Earlene Burton, CNP, Brenda Schneider, CNP, Rico Carvalho, PA-C, Nadia Carrizales, CNP,  Joyce Lion, CNS, Laureen Burroughs, CNP, Kandy Casey, CNP,   Alissa Hager, CNP         [facility]   IN-PATIENT SERVICE   [facility]    Discharge Summary     Patient ID: Lynnette Rosado  :  3/15/1929   MRN: [id number]     ACCOUNT:  [id number]   Patient's PCP: Thania Brooks APRN - NP  Admit Date: 2025   Discharge Date: 2025  Length of Stay: 0  Code Status:  DNR-CC  Admitting Physician: Yossi Ellsworth MD  Discharge Physician: Yossi Ellsworth MD     Active Discharge Diagnoses:     Hospital Problem Lists:  Principal Problem:    Generalized weakness  Active Problems:    Alzheimer's disease, unspecified (HCC)    Essential (primary) hypertension    Sensorineural hearing loss (SNHL) of both ears    Closed compression fracture of L4 lumbar vertebra, initial encounter ([facility])    Compression fracture of L4 lumbar vertebra, closed, initial encounter ([facility])  Resolved Problems:    * No resolved hospital problems. *      Admission

## 2025-05-05 NOTE — ACP (ADVANCE CARE PLANNING)
..Advance Care Planning     Palliative Team Advance Care Planning (ACP) Conversation    Date of Conversation: 05/05/25    Individuals present for the conversation:  Patient's daughter Jeanne states that she is the HCPOA, and I request a copy.      ACP documents on file prior to discussion:  -Power of  for Healthcare  -Living Will    Previously completed document/s not on file:    document was completed previously but it is not available for review. This writer requested a copy of the document for patient's chart.     Healthcare Decision Maker:  Primary decision maker: Jeanne Nuno ( daughter ) [phone]    Conversation Summary:  the patient has 4 children and daughter Jeanne states that she is the HCPOA. She states that she will provide a copy     Resuscitation Status:   Code Status: DNR-CC     Documentation Completed:  -No new documents completed.        Bibi Patel RN

## (undated) DEVICE — BONE TAMP KIT KPX153PB FF X2 15/3 1 STP: Brand: KYPHOPAK™ TRAY

## (undated) DEVICE — SOLUTION IRRIG 1000ML 09% SOD CHL USP PIC PLAS CONTAINER

## (undated) DEVICE — MASTISOL ADHESIVE LIQ 2/3ML

## (undated) DEVICE — CEMENT GUN AND BONE FILLER CDS3A SIZE 3: Brand: MEDTRONIC REUSABLE INSTRUMENTS

## (undated) DEVICE — 1010 S-DRAPE TOWEL DRAPE 10/BX: Brand: STERI-DRAPE™

## (undated) DEVICE — CUSHION PRONEVIEW L HD NK FOAM

## (undated) DEVICE — BONE CEMENT CT01B KYPHON VUE W MIXER: Brand: KYPHON VUE BONE CEMENT AND KYPHON MIXER

## (undated) DEVICE — JACKSON TABLE POSITIONER KIT: Brand: MEDLINE INDUSTRIES, INC.

## (undated) DEVICE — Device

## (undated) DEVICE — GLOVE SURG SZ 8 THK118MIL BLK LTX FREE POLYISOPRENE BEAD CUF

## (undated) DEVICE — CONTAINER,SPECIMEN,4OZ,OR STRL: Brand: MEDLINE

## (undated) DEVICE — CURETTE A13A SIZE 2 T-TIP: Brand: KYPHON® EXPRESS ™ CURETTE

## (undated) DEVICE — INTENDED FOR TISSUE SEPARATION, AND OTHER PROCEDURES THAT REQUIRE A SHARP SURGICAL BLADE TO PUNCTURE OR CUT.: Brand: BARD-PARKER ® CARBON RIB-BACK BLADES

## (undated) DEVICE — CEMENT CARTRIDGES CC02A CDS: Brand: KYPHON® CEMENT DELIVERY SYSTEM

## (undated) DEVICE — GOWN,SIRUS,POLYRNF,BRTHSLV,2XL,18/CS: Brand: MEDLINE